# Patient Record
Sex: FEMALE | Race: WHITE | NOT HISPANIC OR LATINO
[De-identification: names, ages, dates, MRNs, and addresses within clinical notes are randomized per-mention and may not be internally consistent; named-entity substitution may affect disease eponyms.]

---

## 2018-11-07 ENCOUNTER — ASOB RESULT (OUTPATIENT)
Age: 33
End: 2018-11-07

## 2018-11-07 ENCOUNTER — APPOINTMENT (OUTPATIENT)
Dept: ANTEPARTUM | Facility: CLINIC | Age: 33
End: 2018-11-07
Payer: COMMERCIAL

## 2018-11-07 PROCEDURE — 76811 OB US DETAILED SNGL FETUS: CPT

## 2018-11-07 PROCEDURE — 99243 OFF/OP CNSLTJ NEW/EST LOW 30: CPT | Mod: 25

## 2018-11-12 ENCOUNTER — APPOINTMENT (OUTPATIENT)
Dept: ANTEPARTUM | Facility: CLINIC | Age: 33
End: 2018-11-12
Payer: COMMERCIAL

## 2018-11-12 ENCOUNTER — ASOB RESULT (OUTPATIENT)
Age: 33
End: 2018-11-12

## 2018-11-12 PROCEDURE — 99241 OFFICE CONSULTATION NEW/ESTAB PATIENT 15 MIN: CPT | Mod: 25

## 2018-11-21 ENCOUNTER — OUTPATIENT (OUTPATIENT)
Dept: OUTPATIENT SERVICES | Age: 33
LOS: 1 days | Discharge: HOME | End: 2018-11-21

## 2018-11-23 ENCOUNTER — APPOINTMENT (OUTPATIENT)
Dept: PEDIATRIC CARDIOLOGY | Facility: CLINIC | Age: 33
End: 2018-11-23

## 2018-11-25 ENCOUNTER — TRANSCRIPTION ENCOUNTER (OUTPATIENT)
Age: 33
End: 2018-11-25

## 2018-11-29 ENCOUNTER — APPOINTMENT (OUTPATIENT)
Dept: PEDIATRIC CARDIOLOGY | Facility: CLINIC | Age: 33
End: 2018-11-29
Payer: COMMERCIAL

## 2018-11-29 PROCEDURE — 76821 MIDDLE CEREBRAL ARTERY ECHO: CPT

## 2018-11-29 PROCEDURE — 76825 ECHO EXAM OF FETAL HEART: CPT

## 2018-11-29 PROCEDURE — 76827 ECHO EXAM OF FETAL HEART: CPT

## 2018-11-29 PROCEDURE — 76820 UMBILICAL ARTERY ECHO: CPT

## 2018-11-29 PROCEDURE — 99202 OFFICE O/P NEW SF 15 MIN: CPT | Mod: 25

## 2018-11-29 PROCEDURE — 93325 DOPPLER ECHO COLOR FLOW MAPG: CPT | Mod: 59

## 2018-12-07 ENCOUNTER — APPOINTMENT (OUTPATIENT)
Dept: ANTEPARTUM | Facility: CLINIC | Age: 33
End: 2018-12-07
Payer: COMMERCIAL

## 2018-12-07 ENCOUNTER — ASOB RESULT (OUTPATIENT)
Age: 33
End: 2018-12-07

## 2018-12-07 PROCEDURE — 99214 OFFICE O/P EST MOD 30 MIN: CPT | Mod: 25

## 2018-12-07 PROCEDURE — 76816 OB US FOLLOW-UP PER FETUS: CPT

## 2018-12-18 ENCOUNTER — APPOINTMENT (OUTPATIENT)
Dept: PLASTIC SURGERY | Facility: CLINIC | Age: 33
End: 2018-12-18
Payer: COMMERCIAL

## 2018-12-18 VITALS — HEIGHT: 64 IN | BODY MASS INDEX: 34.15 KG/M2 | WEIGHT: 200 LBS

## 2018-12-18 DIAGNOSIS — Z80.0 FAMILY HISTORY OF MALIGNANT NEOPLASM OF DIGESTIVE ORGANS: ICD-10-CM

## 2018-12-18 DIAGNOSIS — Z80.3 FAMILY HISTORY OF MALIGNANT NEOPLASM OF BREAST: ICD-10-CM

## 2018-12-18 DIAGNOSIS — O28.3 ABNORMAL ULTRASONIC FINDING ON ANTENATAL SCREENING OF MOTHER: ICD-10-CM

## 2018-12-18 DIAGNOSIS — Z82.49 FAMILY HISTORY OF ISCHEMIC HEART DISEASE AND OTHER DISEASES OF THE CIRCULATORY SYSTEM: ICD-10-CM

## 2018-12-18 PROCEDURE — 99203 OFFICE O/P NEW LOW 30 MIN: CPT

## 2018-12-28 ENCOUNTER — APPOINTMENT (OUTPATIENT)
Dept: ANTEPARTUM | Facility: CLINIC | Age: 33
End: 2018-12-28
Payer: COMMERCIAL

## 2018-12-28 ENCOUNTER — ASOB RESULT (OUTPATIENT)
Age: 33
End: 2018-12-28

## 2018-12-28 PROCEDURE — 76816 OB US FOLLOW-UP PER FETUS: CPT

## 2018-12-28 PROCEDURE — 76819 FETAL BIOPHYS PROFIL W/O NST: CPT

## 2019-01-28 ENCOUNTER — APPOINTMENT (OUTPATIENT)
Dept: ANTEPARTUM | Facility: CLINIC | Age: 34
End: 2019-01-28
Payer: COMMERCIAL

## 2019-01-28 ENCOUNTER — ASOB RESULT (OUTPATIENT)
Age: 34
End: 2019-01-28

## 2019-01-28 PROCEDURE — 76819 FETAL BIOPHYS PROFIL W/O NST: CPT

## 2019-01-28 PROCEDURE — 76816 OB US FOLLOW-UP PER FETUS: CPT

## 2019-02-06 ENCOUNTER — ASOB RESULT (OUTPATIENT)
Age: 34
End: 2019-02-06

## 2019-02-06 ENCOUNTER — APPOINTMENT (OUTPATIENT)
Dept: ANTEPARTUM | Facility: HOSPITAL | Age: 34
End: 2019-02-06
Payer: COMMERCIAL

## 2019-02-06 ENCOUNTER — OUTPATIENT (OUTPATIENT)
Dept: OUTPATIENT SERVICES | Facility: HOSPITAL | Age: 34
LOS: 1 days | End: 2019-02-06

## 2019-02-06 DIAGNOSIS — Z3A.00 WEEKS OF GESTATION OF PREGNANCY NOT SPECIFIED: ICD-10-CM

## 2019-02-06 DIAGNOSIS — O26.899 OTHER SPECIFIED PREGNANCY RELATED CONDITIONS, UNSPECIFIED TRIMESTER: ICD-10-CM

## 2019-02-06 LAB
ALBUMIN SERPL ELPH-MCNC: 3.7 G/DL — SIGNIFICANT CHANGE UP (ref 3.3–5)
ALP SERPL-CCNC: 98 U/L — SIGNIFICANT CHANGE UP (ref 40–120)
ALT FLD-CCNC: 19 U/L — SIGNIFICANT CHANGE UP (ref 4–33)
ANION GAP SERPL CALC-SCNC: 15 MMO/L — HIGH (ref 7–14)
APPEARANCE UR: CLEAR — SIGNIFICANT CHANGE UP
APTT BLD: 29.7 SEC — SIGNIFICANT CHANGE UP (ref 27.5–36.3)
AST SERPL-CCNC: 16 U/L — SIGNIFICANT CHANGE UP (ref 4–32)
BASOPHILS # BLD AUTO: 0.04 K/UL — SIGNIFICANT CHANGE UP (ref 0–0.2)
BASOPHILS NFR BLD AUTO: 0.3 % — SIGNIFICANT CHANGE UP (ref 0–2)
BILIRUB SERPL-MCNC: < 0.2 MG/DL — LOW (ref 0.2–1.2)
BILIRUB UR-MCNC: NEGATIVE — SIGNIFICANT CHANGE UP
BLOOD UR QL VISUAL: SIGNIFICANT CHANGE UP
BUN SERPL-MCNC: 11 MG/DL — SIGNIFICANT CHANGE UP (ref 7–23)
CALCIUM SERPL-MCNC: 9.5 MG/DL — SIGNIFICANT CHANGE UP (ref 8.4–10.5)
CHLORIDE SERPL-SCNC: 101 MMOL/L — SIGNIFICANT CHANGE UP (ref 98–107)
CO2 SERPL-SCNC: 18 MMOL/L — LOW (ref 22–31)
COLOR SPEC: SIGNIFICANT CHANGE UP
CREAT ?TM UR-MCNC: 43.3 MG/DL — SIGNIFICANT CHANGE UP
CREAT SERPL-MCNC: 0.51 MG/DL — SIGNIFICANT CHANGE UP (ref 0.5–1.3)
EOSINOPHIL # BLD AUTO: 0.06 K/UL — SIGNIFICANT CHANGE UP (ref 0–0.5)
EOSINOPHIL NFR BLD AUTO: 0.4 % — SIGNIFICANT CHANGE UP (ref 0–6)
FIBRINOGEN PPP-MCNC: 784 MG/DL — HIGH (ref 350–510)
GLUCOSE SERPL-MCNC: 74 MG/DL — SIGNIFICANT CHANGE UP (ref 70–99)
GLUCOSE UR-MCNC: NEGATIVE — SIGNIFICANT CHANGE UP
HCT VFR BLD CALC: 39 % — SIGNIFICANT CHANGE UP (ref 34.5–45)
HGB BLD-MCNC: 12.8 G/DL — SIGNIFICANT CHANGE UP (ref 11.5–15.5)
IMM GRANULOCYTES NFR BLD AUTO: 0.8 % — SIGNIFICANT CHANGE UP (ref 0–1.5)
INR BLD: 1.05 — SIGNIFICANT CHANGE UP (ref 0.88–1.17)
KETONES UR-MCNC: NEGATIVE — SIGNIFICANT CHANGE UP
LDH SERPL L TO P-CCNC: 181 U/L — SIGNIFICANT CHANGE UP (ref 135–225)
LEUKOCYTE ESTERASE UR-ACNC: NEGATIVE — SIGNIFICANT CHANGE UP
LYMPHOCYTES # BLD AUTO: 1.81 K/UL — SIGNIFICANT CHANGE UP (ref 1–3.3)
LYMPHOCYTES # BLD AUTO: 13.5 % — SIGNIFICANT CHANGE UP (ref 13–44)
MCHC RBC-ENTMCNC: 30.1 PG — SIGNIFICANT CHANGE UP (ref 27–34)
MCHC RBC-ENTMCNC: 32.8 % — SIGNIFICANT CHANGE UP (ref 32–36)
MCV RBC AUTO: 91.8 FL — SIGNIFICANT CHANGE UP (ref 80–100)
MONOCYTES # BLD AUTO: 0.87 K/UL — SIGNIFICANT CHANGE UP (ref 0–0.9)
MONOCYTES NFR BLD AUTO: 6.5 % — SIGNIFICANT CHANGE UP (ref 2–14)
NEUTROPHILS # BLD AUTO: 10.47 K/UL — HIGH (ref 1.8–7.4)
NEUTROPHILS NFR BLD AUTO: 78.5 % — HIGH (ref 43–77)
NITRITE UR-MCNC: NEGATIVE — SIGNIFICANT CHANGE UP
NRBC # FLD: 0 K/UL — LOW (ref 25–125)
PH UR: 6.5 — SIGNIFICANT CHANGE UP (ref 5–8)
PLATELET # BLD AUTO: 359 K/UL — SIGNIFICANT CHANGE UP (ref 150–400)
PMV BLD: 9.8 FL — SIGNIFICANT CHANGE UP (ref 7–13)
POTASSIUM SERPL-MCNC: 3.7 MMOL/L — SIGNIFICANT CHANGE UP (ref 3.5–5.3)
POTASSIUM SERPL-SCNC: 3.7 MMOL/L — SIGNIFICANT CHANGE UP (ref 3.5–5.3)
PROT SERPL-MCNC: 6.9 G/DL — SIGNIFICANT CHANGE UP (ref 6–8.3)
PROT UR-MCNC: 7 MG/DL — SIGNIFICANT CHANGE UP
PROT UR-MCNC: NEGATIVE — SIGNIFICANT CHANGE UP
PROTHROM AB SERPL-ACNC: 12 SEC — SIGNIFICANT CHANGE UP (ref 9.8–13.1)
RBC # BLD: 4.25 M/UL — SIGNIFICANT CHANGE UP (ref 3.8–5.2)
RBC # FLD: 12.9 % — SIGNIFICANT CHANGE UP (ref 10.3–14.5)
SODIUM SERPL-SCNC: 134 MMOL/L — LOW (ref 135–145)
SP GR SPEC: 1.01 — SIGNIFICANT CHANGE UP (ref 1–1.04)
URATE SERPL-MCNC: 3.8 MG/DL — SIGNIFICANT CHANGE UP (ref 2.5–7)
UROBILINOGEN FLD QL: NORMAL — SIGNIFICANT CHANGE UP
WBC # BLD: 13.36 K/UL — HIGH (ref 3.8–10.5)
WBC # FLD AUTO: 13.36 K/UL — HIGH (ref 3.8–10.5)

## 2019-02-06 PROCEDURE — 76819 FETAL BIOPHYS PROFIL W/O NST: CPT

## 2019-02-07 ENCOUNTER — APPOINTMENT (OUTPATIENT)
Dept: OTHER | Facility: CLINIC | Age: 34
End: 2019-02-07
Payer: COMMERCIAL

## 2019-02-07 PROCEDURE — 99242 OFF/OP CONSLTJ NEW/EST SF 20: CPT

## 2019-02-11 ENCOUNTER — OUTPATIENT (OUTPATIENT)
Dept: OUTPATIENT SERVICES | Facility: HOSPITAL | Age: 34
LOS: 1 days | End: 2019-02-11
Payer: COMMERCIAL

## 2019-02-11 DIAGNOSIS — O26.899 OTHER SPECIFIED PREGNANCY RELATED CONDITIONS, UNSPECIFIED TRIMESTER: ICD-10-CM

## 2019-02-11 DIAGNOSIS — Z3A.00 WEEKS OF GESTATION OF PREGNANCY NOT SPECIFIED: ICD-10-CM

## 2019-02-11 LAB
ALBUMIN SERPL ELPH-MCNC: 3.5 G/DL — SIGNIFICANT CHANGE UP (ref 3.3–5)
ALP SERPL-CCNC: 94 U/L — SIGNIFICANT CHANGE UP (ref 40–120)
ALT FLD-CCNC: 16 U/L — SIGNIFICANT CHANGE UP (ref 4–33)
ANION GAP SERPL CALC-SCNC: 12 MMO/L — SIGNIFICANT CHANGE UP (ref 7–14)
APPEARANCE UR: SIGNIFICANT CHANGE UP
APTT BLD: 28.5 SEC — SIGNIFICANT CHANGE UP (ref 27.5–36.3)
AST SERPL-CCNC: 14 U/L — SIGNIFICANT CHANGE UP (ref 4–32)
BACTERIA # UR AUTO: SIGNIFICANT CHANGE UP
BASOPHILS # BLD AUTO: 0.05 K/UL — SIGNIFICANT CHANGE UP (ref 0–0.2)
BASOPHILS NFR BLD AUTO: 0.4 % — SIGNIFICANT CHANGE UP (ref 0–2)
BILIRUB SERPL-MCNC: < 0.2 MG/DL — LOW (ref 0.2–1.2)
BILIRUB UR-MCNC: NEGATIVE — SIGNIFICANT CHANGE UP
BLOOD UR QL VISUAL: SIGNIFICANT CHANGE UP
BUN SERPL-MCNC: 10 MG/DL — SIGNIFICANT CHANGE UP (ref 7–23)
CALCIUM SERPL-MCNC: 9.2 MG/DL — SIGNIFICANT CHANGE UP (ref 8.4–10.5)
CHLORIDE SERPL-SCNC: 103 MMOL/L — SIGNIFICANT CHANGE UP (ref 98–107)
CO2 SERPL-SCNC: 21 MMOL/L — LOW (ref 22–31)
COLOR SPEC: YELLOW — SIGNIFICANT CHANGE UP
CREAT ?TM UR-MCNC: 102.9 MG/DL — SIGNIFICANT CHANGE UP
CREAT SERPL-MCNC: 0.57 MG/DL — SIGNIFICANT CHANGE UP (ref 0.5–1.3)
EOSINOPHIL # BLD AUTO: 0.1 K/UL — SIGNIFICANT CHANGE UP (ref 0–0.5)
EOSINOPHIL NFR BLD AUTO: 0.7 % — SIGNIFICANT CHANGE UP (ref 0–6)
FIBRINOGEN PPP-MCNC: 751.4 MG/DL — HIGH (ref 350–510)
GLUCOSE SERPL-MCNC: 81 MG/DL — SIGNIFICANT CHANGE UP (ref 70–99)
GLUCOSE UR-MCNC: NEGATIVE — SIGNIFICANT CHANGE UP
HCT VFR BLD CALC: 37.8 % — SIGNIFICANT CHANGE UP (ref 34.5–45)
HGB BLD-MCNC: 12.3 G/DL — SIGNIFICANT CHANGE UP (ref 11.5–15.5)
IMM GRANULOCYTES NFR BLD AUTO: 1 % — SIGNIFICANT CHANGE UP (ref 0–1.5)
INR BLD: 1.1 — SIGNIFICANT CHANGE UP (ref 0.88–1.17)
KETONES UR-MCNC: NEGATIVE — SIGNIFICANT CHANGE UP
LDH SERPL L TO P-CCNC: 162 U/L — SIGNIFICANT CHANGE UP (ref 135–225)
LEUKOCYTE ESTERASE UR-ACNC: NEGATIVE — SIGNIFICANT CHANGE UP
LYMPHOCYTES # BLD AUTO: 15.5 % — SIGNIFICANT CHANGE UP (ref 13–44)
LYMPHOCYTES # BLD AUTO: 2.09 K/UL — SIGNIFICANT CHANGE UP (ref 1–3.3)
MCHC RBC-ENTMCNC: 29.4 PG — SIGNIFICANT CHANGE UP (ref 27–34)
MCHC RBC-ENTMCNC: 32.5 % — SIGNIFICANT CHANGE UP (ref 32–36)
MCV RBC AUTO: 90.4 FL — SIGNIFICANT CHANGE UP (ref 80–100)
MONOCYTES # BLD AUTO: 1.06 K/UL — HIGH (ref 0–0.9)
MONOCYTES NFR BLD AUTO: 7.9 % — SIGNIFICANT CHANGE UP (ref 2–14)
NEUTROPHILS # BLD AUTO: 10.06 K/UL — HIGH (ref 1.8–7.4)
NEUTROPHILS NFR BLD AUTO: 74.5 % — SIGNIFICANT CHANGE UP (ref 43–77)
NITRITE UR-MCNC: NEGATIVE — SIGNIFICANT CHANGE UP
NRBC # FLD: 0 K/UL — LOW (ref 25–125)
PH UR: 6 — SIGNIFICANT CHANGE UP (ref 5–8)
PLATELET # BLD AUTO: 310 K/UL — SIGNIFICANT CHANGE UP (ref 150–400)
PMV BLD: 9.7 FL — SIGNIFICANT CHANGE UP (ref 7–13)
POTASSIUM SERPL-MCNC: 4.2 MMOL/L — SIGNIFICANT CHANGE UP (ref 3.5–5.3)
POTASSIUM SERPL-SCNC: 4.2 MMOL/L — SIGNIFICANT CHANGE UP (ref 3.5–5.3)
PROT SERPL-MCNC: 6.5 G/DL — SIGNIFICANT CHANGE UP (ref 6–8.3)
PROT UR-MCNC: 20 — SIGNIFICANT CHANGE UP
PROT UR-MCNC: 29.5 MG/DL — SIGNIFICANT CHANGE UP
PROTHROM AB SERPL-ACNC: 12.2 SEC — SIGNIFICANT CHANGE UP (ref 9.8–13.1)
RBC # BLD: 4.18 M/UL — SIGNIFICANT CHANGE UP (ref 3.8–5.2)
RBC # FLD: 12.8 % — SIGNIFICANT CHANGE UP (ref 10.3–14.5)
RBC CASTS # UR COMP ASSIST: SIGNIFICANT CHANGE UP (ref 0–?)
SODIUM SERPL-SCNC: 136 MMOL/L — SIGNIFICANT CHANGE UP (ref 135–145)
SP GR SPEC: 1.02 — SIGNIFICANT CHANGE UP (ref 1–1.04)
SQUAMOUS # UR AUTO: SIGNIFICANT CHANGE UP
URATE SERPL-MCNC: 3.9 MG/DL — SIGNIFICANT CHANGE UP (ref 2.5–7)
UROBILINOGEN FLD QL: NORMAL — SIGNIFICANT CHANGE UP
WBC # BLD: 13.5 K/UL — HIGH (ref 3.8–10.5)
WBC # FLD AUTO: 13.5 K/UL — HIGH (ref 3.8–10.5)
WBC UR QL: HIGH (ref 0–?)

## 2019-02-11 PROCEDURE — 59025 FETAL NON-STRESS TEST: CPT | Mod: 26

## 2019-02-11 RX ORDER — ACETAMINOPHEN 500 MG
975 TABLET ORAL ONCE
Qty: 0 | Refills: 0 | Status: DISCONTINUED | OUTPATIENT
Start: 2019-02-11 | End: 2019-02-26

## 2019-02-19 ENCOUNTER — ASOB RESULT (OUTPATIENT)
Age: 34
End: 2019-02-19

## 2019-02-19 ENCOUNTER — APPOINTMENT (OUTPATIENT)
Dept: ANTEPARTUM | Facility: CLINIC | Age: 34
End: 2019-02-19
Payer: COMMERCIAL

## 2019-02-19 PROCEDURE — 76816 OB US FOLLOW-UP PER FETUS: CPT

## 2019-02-19 PROCEDURE — 76819 FETAL BIOPHYS PROFIL W/O NST: CPT

## 2019-02-26 ENCOUNTER — OUTPATIENT (OUTPATIENT)
Dept: OUTPATIENT SERVICES | Facility: HOSPITAL | Age: 34
LOS: 1 days | End: 2019-02-26

## 2019-02-26 VITALS
DIASTOLIC BLOOD PRESSURE: 78 MMHG | WEIGHT: 229.94 LBS | RESPIRATION RATE: 16 BRPM | SYSTOLIC BLOOD PRESSURE: 120 MMHG | HEART RATE: 84 BPM | TEMPERATURE: 97 F | OXYGEN SATURATION: 98 % | HEIGHT: 64 IN

## 2019-02-26 DIAGNOSIS — O40.3XX1 POLYHYDRAMNIOS, THIRD TRIMESTER, FETUS 1: ICD-10-CM

## 2019-02-26 DIAGNOSIS — Z98.890 OTHER SPECIFIED POSTPROCEDURAL STATES: Chronic | ICD-10-CM

## 2019-02-26 LAB
APPEARANCE UR: SIGNIFICANT CHANGE UP
BACTERIA # UR AUTO: SIGNIFICANT CHANGE UP
BILIRUB UR-MCNC: NEGATIVE — SIGNIFICANT CHANGE UP
BLD GP AB SCN SERPL QL: NEGATIVE — SIGNIFICANT CHANGE UP
BLOOD UR QL VISUAL: SIGNIFICANT CHANGE UP
COD CRY URNS QL: SIGNIFICANT CHANGE UP (ref 0–0)
COLOR SPEC: YELLOW — SIGNIFICANT CHANGE UP
GLUCOSE UR-MCNC: NEGATIVE — SIGNIFICANT CHANGE UP
HCT VFR BLD CALC: 36.3 % — SIGNIFICANT CHANGE UP (ref 34.5–45)
HGB BLD-MCNC: 11.8 G/DL — SIGNIFICANT CHANGE UP (ref 11.5–15.5)
HYALINE CASTS # UR AUTO: HIGH
KETONES UR-MCNC: NEGATIVE — SIGNIFICANT CHANGE UP
LEUKOCYTE ESTERASE UR-ACNC: NEGATIVE — SIGNIFICANT CHANGE UP
MCHC RBC-ENTMCNC: 29.4 PG — SIGNIFICANT CHANGE UP (ref 27–34)
MCHC RBC-ENTMCNC: 32.5 % — SIGNIFICANT CHANGE UP (ref 32–36)
MCV RBC AUTO: 90.3 FL — SIGNIFICANT CHANGE UP (ref 80–100)
NITRITE UR-MCNC: NEGATIVE — SIGNIFICANT CHANGE UP
NRBC # FLD: 0 K/UL — LOW (ref 25–125)
PH UR: 6 — SIGNIFICANT CHANGE UP (ref 5–8)
PLATELET # BLD AUTO: 319 K/UL — SIGNIFICANT CHANGE UP (ref 150–400)
PMV BLD: 10 FL — SIGNIFICANT CHANGE UP (ref 7–13)
PROT UR-MCNC: 30 — SIGNIFICANT CHANGE UP
RBC # BLD: 4.02 M/UL — SIGNIFICANT CHANGE UP (ref 3.8–5.2)
RBC # FLD: 13.2 % — SIGNIFICANT CHANGE UP (ref 10.3–14.5)
RBC CASTS # UR COMP ASSIST: SIGNIFICANT CHANGE UP (ref 0–?)
RH IG SCN BLD-IMP: POSITIVE — SIGNIFICANT CHANGE UP
SP GR SPEC: 1.03 — SIGNIFICANT CHANGE UP (ref 1–1.04)
SQUAMOUS # UR AUTO: SIGNIFICANT CHANGE UP
UROBILINOGEN FLD QL: NORMAL — SIGNIFICANT CHANGE UP
WBC # BLD: 11.92 K/UL — HIGH (ref 3.8–10.5)
WBC # FLD AUTO: 11.92 K/UL — HIGH (ref 3.8–10.5)
WBC UR QL: SIGNIFICANT CHANGE UP (ref 0–?)

## 2019-02-26 NOTE — H&P PST ADULT - HISTORY OF PRESENT ILLNESS
34 y/o female 38.4 weeks pregnant, , ENMA 19, LMP 18 presents to PST for pre op evaluation in preparation for Cytotec Induction of labor on 19 due to polyhydramnios and  Cleft lip and palate as per OB GYN Dr. Hamlin.  Pt denies abnormal vaginal bleeding or leaking  Reports positive fetal movement during PST

## 2019-02-26 NOTE — H&P PST ADULT - FAMILY HISTORY
Mother  Still living? Yes, Estimated age: 61-70  Hypertension, Age at diagnosis: Age Unknown     Father  Still living? Yes, Estimated age: 71-80  Family history of throat cancer, Age at diagnosis: Age Unknown     Grandparent  Still living? Unknown  Family history of colon cancer, Age at diagnosis: Age Unknown     Sibling  Still living? Yes, Estimated age: Age Unknown  Family history of Hashimoto thyroiditis, Age at diagnosis: Age Unknown

## 2019-02-26 NOTE — H&P PST ADULT - NSANTHOSAYNRD_GEN_A_CORE
No. MASOUD screening performed.  STOP BANG Legend: 0-2 = LOW Risk; 3-4 = INTERMEDIATE Risk; 5-8 = HIGH Risk

## 2019-02-26 NOTE — H&P PST ADULT - NEGATIVE GENERAL GENITOURINARY SYMPTOMS
no hematuria/no flank pain R/no renal colic/no flank pain L/no gas in urine/no bladder infections/no urine discoloration

## 2019-02-26 NOTE — H&P PST ADULT - PROBLEM SELECTOR PLAN 1
Scheduled for Cytotec Induction of Labor at 40 weeks, Poly, Cleft lip and palate on 03/03/19 as per OB GYN Dr. Hamlin. Pre op instructions, famotidine given and explained. Pt verbalized understanding.

## 2019-02-26 NOTE — H&P PST ADULT - NEGATIVE CARDIOVASCULAR SYMPTOMS
no orthopnea/no paroxysmal nocturnal dyspnea/no palpitations/no claudication/no dyspnea on exertion/no chest pain/no peripheral edema

## 2019-02-26 NOTE — H&P PST ADULT - RS GEN PE MLT RESP DETAILS PC
no rhonchi/breath sounds equal/clear to auscultation bilaterally/no intercostal retractions/no wheezes/no chest wall tenderness/no subcutaneous emphysema/good air movement/no rales/respirations non-labored/airway patent

## 2019-02-27 LAB — T PALLIDUM AB TITR SER: NEGATIVE — SIGNIFICANT CHANGE UP

## 2019-03-02 ENCOUNTER — TRANSCRIPTION ENCOUNTER (OUTPATIENT)
Age: 34
End: 2019-03-02

## 2019-03-03 ENCOUNTER — INPATIENT (INPATIENT)
Facility: HOSPITAL | Age: 34
LOS: 6 days | Discharge: HOME | End: 2019-03-10
Attending: OBSTETRICS & GYNECOLOGY | Admitting: OBSTETRICS & GYNECOLOGY
Payer: COMMERCIAL

## 2019-03-03 DIAGNOSIS — O40.3XX1 POLYHYDRAMNIOS, THIRD TRIMESTER, FETUS 1: ICD-10-CM

## 2019-03-03 DIAGNOSIS — Z98.890 OTHER SPECIFIED POSTPROCEDURAL STATES: Chronic | ICD-10-CM

## 2019-03-04 VITALS — WEIGHT: 227.08 LBS | HEIGHT: 64 IN

## 2019-03-04 LAB
ALBUMIN SERPL ELPH-MCNC: 3.6 G/DL — SIGNIFICANT CHANGE UP (ref 3.3–5)
ALP SERPL-CCNC: 120 U/L — SIGNIFICANT CHANGE UP (ref 40–120)
ALT FLD-CCNC: 21 U/L — SIGNIFICANT CHANGE UP (ref 4–33)
ANION GAP SERPL CALC-SCNC: 15 MMO/L — HIGH (ref 7–14)
APPEARANCE UR: CLEAR — SIGNIFICANT CHANGE UP
APTT BLD: 28.5 SEC — SIGNIFICANT CHANGE UP (ref 27.5–36.3)
AST SERPL-CCNC: 18 U/L — SIGNIFICANT CHANGE UP (ref 4–32)
BASOPHILS # BLD AUTO: 0.05 K/UL — SIGNIFICANT CHANGE UP (ref 0–0.2)
BASOPHILS NFR BLD AUTO: 0.4 % — SIGNIFICANT CHANGE UP (ref 0–2)
BILIRUB SERPL-MCNC: < 0.2 MG/DL — LOW (ref 0.2–1.2)
BILIRUB UR-MCNC: NEGATIVE — SIGNIFICANT CHANGE UP
BLOOD UR QL VISUAL: SIGNIFICANT CHANGE UP
BUN SERPL-MCNC: 10 MG/DL — SIGNIFICANT CHANGE UP (ref 7–23)
CALCIUM SERPL-MCNC: 10.2 MG/DL — SIGNIFICANT CHANGE UP (ref 8.4–10.5)
CHLORIDE SERPL-SCNC: 101 MMOL/L — SIGNIFICANT CHANGE UP (ref 98–107)
CO2 SERPL-SCNC: 22 MMOL/L — SIGNIFICANT CHANGE UP (ref 22–31)
COLOR SPEC: SIGNIFICANT CHANGE UP
CREAT ?TM UR-MCNC: 67 MG/DL — SIGNIFICANT CHANGE UP
CREAT SERPL-MCNC: 0.66 MG/DL — SIGNIFICANT CHANGE UP (ref 0.5–1.3)
EOSINOPHIL # BLD AUTO: 0.12 K/UL — SIGNIFICANT CHANGE UP (ref 0–0.5)
EOSINOPHIL NFR BLD AUTO: 1 % — SIGNIFICANT CHANGE UP (ref 0–6)
EPI CELLS # UR: SIGNIFICANT CHANGE UP
FIBRINOGEN PPP-MCNC: 760 MG/DL — HIGH (ref 350–510)
GLUCOSE SERPL-MCNC: 91 MG/DL — SIGNIFICANT CHANGE UP (ref 70–99)
GLUCOSE UR-MCNC: NEGATIVE — SIGNIFICANT CHANGE UP
HCT VFR BLD CALC: 40.1 % — SIGNIFICANT CHANGE UP (ref 34.5–45)
HGB BLD-MCNC: 12.7 G/DL — SIGNIFICANT CHANGE UP (ref 11.5–15.5)
IMM GRANULOCYTES NFR BLD AUTO: 0.8 % — SIGNIFICANT CHANGE UP (ref 0–1.5)
INR BLD: 1.04 — SIGNIFICANT CHANGE UP (ref 0.88–1.17)
KETONES UR-MCNC: NEGATIVE — SIGNIFICANT CHANGE UP
LDH SERPL L TO P-CCNC: 189 U/L — SIGNIFICANT CHANGE UP (ref 135–225)
LEUKOCYTE ESTERASE UR-ACNC: NEGATIVE — SIGNIFICANT CHANGE UP
LYMPHOCYTES # BLD AUTO: 16.3 % — SIGNIFICANT CHANGE UP (ref 13–44)
LYMPHOCYTES # BLD AUTO: 2.04 K/UL — SIGNIFICANT CHANGE UP (ref 1–3.3)
MCHC RBC-ENTMCNC: 29.3 PG — SIGNIFICANT CHANGE UP (ref 27–34)
MCHC RBC-ENTMCNC: 31.7 % — LOW (ref 32–36)
MCV RBC AUTO: 92.6 FL — SIGNIFICANT CHANGE UP (ref 80–100)
MONOCYTES # BLD AUTO: 1.07 K/UL — HIGH (ref 0–0.9)
MONOCYTES NFR BLD AUTO: 8.5 % — SIGNIFICANT CHANGE UP (ref 2–14)
NEUTROPHILS # BLD AUTO: 9.17 K/UL — HIGH (ref 1.8–7.4)
NEUTROPHILS NFR BLD AUTO: 73 % — SIGNIFICANT CHANGE UP (ref 43–77)
NITRITE UR-MCNC: NEGATIVE — SIGNIFICANT CHANGE UP
NRBC # FLD: 0 K/UL — LOW (ref 25–125)
PH UR: 6 — SIGNIFICANT CHANGE UP (ref 5–8)
PLATELET # BLD AUTO: 342 K/UL — SIGNIFICANT CHANGE UP (ref 150–400)
PMV BLD: 10.1 FL — SIGNIFICANT CHANGE UP (ref 7–13)
POTASSIUM SERPL-MCNC: 4 MMOL/L — SIGNIFICANT CHANGE UP (ref 3.5–5.3)
POTASSIUM SERPL-SCNC: 4 MMOL/L — SIGNIFICANT CHANGE UP (ref 3.5–5.3)
PROT SERPL-MCNC: 7.2 G/DL — SIGNIFICANT CHANGE UP (ref 6–8.3)
PROT UR-MCNC: 7.7 MG/DL — SIGNIFICANT CHANGE UP
PROT UR-MCNC: NEGATIVE — SIGNIFICANT CHANGE UP
PROTHROM AB SERPL-ACNC: 11.9 SEC — SIGNIFICANT CHANGE UP (ref 9.8–13.1)
RBC # BLD: 4.33 M/UL — SIGNIFICANT CHANGE UP (ref 3.8–5.2)
RBC # FLD: 13.3 % — SIGNIFICANT CHANGE UP (ref 10.3–14.5)
RBC CASTS # UR COMP ASSIST: SIGNIFICANT CHANGE UP (ref 0–?)
RH IG SCN BLD-IMP: POSITIVE — SIGNIFICANT CHANGE UP
SODIUM SERPL-SCNC: 138 MMOL/L — SIGNIFICANT CHANGE UP (ref 135–145)
SP GR SPEC: 1.01 — SIGNIFICANT CHANGE UP (ref 1–1.04)
T PALLIDUM AB TITR SER: NEGATIVE — SIGNIFICANT CHANGE UP
URATE SERPL-MCNC: 4.3 MG/DL — SIGNIFICANT CHANGE UP (ref 2.5–7)
UROBILINOGEN FLD QL: NORMAL — SIGNIFICANT CHANGE UP
WBC # BLD: 12.55 K/UL — HIGH (ref 3.8–10.5)
WBC # FLD AUTO: 12.55 K/UL — HIGH (ref 3.8–10.5)
WBC UR QL: SIGNIFICANT CHANGE UP (ref 0–?)

## 2019-03-04 RX ORDER — SODIUM CHLORIDE 9 MG/ML
1000 INJECTION, SOLUTION INTRAVENOUS ONCE
Qty: 0 | Refills: 0 | Status: DISCONTINUED | OUTPATIENT
Start: 2019-03-04 | End: 2019-03-04

## 2019-03-04 RX ORDER — OXYTOCIN 10 UNIT/ML
333.33 VIAL (ML) INJECTION
Qty: 20 | Refills: 0 | Status: DISCONTINUED | OUTPATIENT
Start: 2019-03-04 | End: 2019-03-04

## 2019-03-04 RX ORDER — SODIUM CHLORIDE 9 MG/ML
1000 INJECTION, SOLUTION INTRAVENOUS ONCE
Qty: 0 | Refills: 0 | Status: DISCONTINUED | OUTPATIENT
Start: 2019-03-04 | End: 2019-03-05

## 2019-03-04 RX ORDER — OXYTOCIN 10 UNIT/ML
333.33 VIAL (ML) INJECTION
Qty: 20 | Refills: 0 | Status: DISCONTINUED | OUTPATIENT
Start: 2019-03-04 | End: 2019-03-05

## 2019-03-04 RX ORDER — CALCIUM CARBONATE 500(1250)
1 TABLET ORAL EVERY 4 HOURS
Qty: 0 | Refills: 0 | Status: DISCONTINUED | OUTPATIENT
Start: 2019-03-04 | End: 2019-03-06

## 2019-03-04 RX ORDER — ACETAMINOPHEN 500 MG
975 TABLET ORAL ONCE
Qty: 0 | Refills: 0 | Status: COMPLETED | OUTPATIENT
Start: 2019-03-04 | End: 2019-03-05

## 2019-03-04 RX ORDER — SODIUM CHLORIDE 9 MG/ML
1000 INJECTION, SOLUTION INTRAVENOUS
Qty: 0 | Refills: 0 | Status: DISCONTINUED | OUTPATIENT
Start: 2019-03-04 | End: 2019-03-04

## 2019-03-04 RX ORDER — SODIUM CHLORIDE 9 MG/ML
1000 INJECTION, SOLUTION INTRAVENOUS
Qty: 0 | Refills: 0 | Status: DISCONTINUED | OUTPATIENT
Start: 2019-03-04 | End: 2019-03-05

## 2019-03-04 RX ORDER — CITRIC ACID/SODIUM CITRATE 300-500 MG
15 SOLUTION, ORAL ORAL EVERY 4 HOURS
Qty: 0 | Refills: 0 | Status: DISCONTINUED | OUTPATIENT
Start: 2019-03-04 | End: 2019-03-05

## 2019-03-04 RX ORDER — ACETAMINOPHEN 500 MG
975 TABLET ORAL ONCE
Qty: 0 | Refills: 0 | Status: COMPLETED | OUTPATIENT
Start: 2019-03-04 | End: 2019-03-04

## 2019-03-04 RX ORDER — VALACYCLOVIR 500 MG/1
500 TABLET, FILM COATED ORAL
Qty: 0 | Refills: 0 | Status: DISCONTINUED | OUTPATIENT
Start: 2019-03-04 | End: 2019-03-06

## 2019-03-04 RX ORDER — CITRIC ACID/SODIUM CITRATE 300-500 MG
15 SOLUTION, ORAL ORAL EVERY 4 HOURS
Qty: 0 | Refills: 0 | Status: DISCONTINUED | OUTPATIENT
Start: 2019-03-04 | End: 2019-03-04

## 2019-03-04 RX ADMIN — VALACYCLOVIR 500 MILLIGRAM(S): 500 TABLET, FILM COATED ORAL at 17:50

## 2019-03-04 RX ADMIN — SODIUM CHLORIDE 125 MILLILITER(S): 9 INJECTION, SOLUTION INTRAVENOUS at 11:09

## 2019-03-04 RX ADMIN — Medication 975 MILLIGRAM(S): at 02:44

## 2019-03-04 RX ADMIN — Medication 1 TABLET(S): at 13:33

## 2019-03-04 RX ADMIN — VALACYCLOVIR 500 MILLIGRAM(S): 500 TABLET, FILM COATED ORAL at 06:45

## 2019-03-05 ENCOUNTER — TRANSCRIPTION ENCOUNTER (OUTPATIENT)
Age: 34
End: 2019-03-05

## 2019-03-05 PROCEDURE — 93010 ELECTROCARDIOGRAM REPORT: CPT

## 2019-03-05 RX ORDER — SODIUM CHLORIDE 9 MG/ML
500 INJECTION, SOLUTION INTRAVENOUS ONCE
Qty: 0 | Refills: 0 | Status: COMPLETED | OUTPATIENT
Start: 2019-03-05 | End: 2019-03-06

## 2019-03-05 RX ADMIN — Medication 975 MILLIGRAM(S): at 04:48

## 2019-03-05 RX ADMIN — Medication 1 TABLET(S): at 11:04

## 2019-03-05 RX ADMIN — Medication 0.25 MILLIGRAM(S): at 11:44

## 2019-03-05 RX ADMIN — VALACYCLOVIR 500 MILLIGRAM(S): 500 TABLET, FILM COATED ORAL at 06:28

## 2019-03-05 RX ADMIN — Medication 975 MILLIGRAM(S): at 03:49

## 2019-03-06 ENCOUNTER — TRANSCRIPTION ENCOUNTER (OUTPATIENT)
Age: 34
End: 2019-03-06

## 2019-03-06 RX ORDER — SODIUM CHLORIDE 9 MG/ML
1000 INJECTION, SOLUTION INTRAVENOUS
Qty: 0 | Refills: 0 | Status: DISCONTINUED | OUTPATIENT
Start: 2019-03-06 | End: 2019-03-06

## 2019-03-06 RX ORDER — DOCUSATE SODIUM 100 MG
100 CAPSULE ORAL
Qty: 0 | Refills: 0 | Status: DISCONTINUED | OUTPATIENT
Start: 2019-03-07 | End: 2019-03-10

## 2019-03-06 RX ORDER — LANOLIN
1 OINTMENT (GRAM) TOPICAL
Qty: 0 | Refills: 0 | Status: DISCONTINUED | OUTPATIENT
Start: 2019-03-07 | End: 2019-03-10

## 2019-03-06 RX ORDER — IBUPROFEN 200 MG
600 TABLET ORAL EVERY 6 HOURS
Qty: 0 | Refills: 0 | Status: COMPLETED | OUTPATIENT
Start: 2019-03-06 | End: 2020-02-02

## 2019-03-06 RX ORDER — VALACYCLOVIR 500 MG/1
1 TABLET, FILM COATED ORAL
Qty: 0 | Refills: 0 | COMMUNITY

## 2019-03-06 RX ORDER — SODIUM CHLORIDE 9 MG/ML
1000 INJECTION INTRAMUSCULAR; INTRAVENOUS; SUBCUTANEOUS ONCE
Qty: 0 | Refills: 0 | Status: DISCONTINUED | OUTPATIENT
Start: 2019-03-06 | End: 2019-03-07

## 2019-03-06 RX ORDER — AMPICILLIN TRIHYDRATE 250 MG
2 CAPSULE ORAL ONCE
Qty: 0 | Refills: 0 | Status: COMPLETED | OUTPATIENT
Start: 2019-03-06 | End: 2019-03-06

## 2019-03-06 RX ORDER — IBUPROFEN 200 MG
1 TABLET ORAL
Qty: 0 | Refills: 0 | DISCHARGE
Start: 2019-03-06

## 2019-03-06 RX ORDER — OXYTOCIN 10 UNIT/ML
41.67 VIAL (ML) INJECTION
Qty: 20 | Refills: 0 | Status: DISCONTINUED | OUTPATIENT
Start: 2019-03-06 | End: 2019-03-07

## 2019-03-06 RX ORDER — DIPHENHYDRAMINE HCL 50 MG
25 CAPSULE ORAL DAILY
Qty: 0 | Refills: 0 | Status: DISCONTINUED | OUTPATIENT
Start: 2019-03-06 | End: 2019-03-06

## 2019-03-06 RX ORDER — ERTAPENEM SODIUM 1 G/1
1000 INJECTION, POWDER, LYOPHILIZED, FOR SOLUTION INTRAMUSCULAR; INTRAVENOUS EVERY 24 HOURS
Qty: 0 | Refills: 0 | Status: DISCONTINUED | OUTPATIENT
Start: 2019-03-06 | End: 2019-03-08

## 2019-03-06 RX ORDER — ACETAMINOPHEN 500 MG
975 TABLET ORAL ONCE
Qty: 0 | Refills: 0 | Status: COMPLETED | OUTPATIENT
Start: 2019-03-06 | End: 2019-03-06

## 2019-03-06 RX ORDER — SODIUM CHLORIDE 9 MG/ML
1000 INJECTION, SOLUTION INTRAVENOUS
Qty: 0 | Refills: 0 | Status: DISCONTINUED | OUTPATIENT
Start: 2019-03-07 | End: 2019-03-07

## 2019-03-06 RX ORDER — GENTAMICIN SULFATE 40 MG/ML
500 VIAL (ML) INJECTION ONCE
Qty: 0 | Refills: 0 | Status: COMPLETED | OUTPATIENT
Start: 2019-03-06 | End: 2019-03-06

## 2019-03-06 RX ORDER — OXYTOCIN 10 UNIT/ML
2 VIAL (ML) INJECTION
Qty: 30 | Refills: 0 | Status: DISCONTINUED | OUTPATIENT
Start: 2019-03-06 | End: 2019-03-06

## 2019-03-06 RX ORDER — SODIUM CHLORIDE 9 MG/ML
500 INJECTION, SOLUTION INTRAVENOUS ONCE
Qty: 0 | Refills: 0 | Status: COMPLETED | OUTPATIENT
Start: 2019-03-06 | End: 2019-03-06

## 2019-03-06 RX ORDER — DIPHENHYDRAMINE HCL 50 MG
25 CAPSULE ORAL EVERY 6 HOURS
Qty: 0 | Refills: 0 | Status: DISCONTINUED | OUTPATIENT
Start: 2019-03-07 | End: 2019-03-10

## 2019-03-06 RX ORDER — AMPICILLIN TRIHYDRATE 250 MG
CAPSULE ORAL
Qty: 0 | Refills: 0 | Status: DISCONTINUED | OUTPATIENT
Start: 2019-03-06 | End: 2019-03-06

## 2019-03-06 RX ORDER — AMPICILLIN TRIHYDRATE 250 MG
2 CAPSULE ORAL EVERY 6 HOURS
Qty: 0 | Refills: 0 | Status: CANCELLED | OUTPATIENT
Start: 2019-03-07 | End: 2019-03-06

## 2019-03-06 RX ORDER — GLYCERIN ADULT
1 SUPPOSITORY, RECTAL RECTAL AT BEDTIME
Qty: 0 | Refills: 0 | Status: DISCONTINUED | OUTPATIENT
Start: 2019-03-07 | End: 2019-03-10

## 2019-03-06 RX ORDER — SIMETHICONE 80 MG/1
80 TABLET, CHEWABLE ORAL EVERY 4 HOURS
Qty: 0 | Refills: 0 | Status: DISCONTINUED | OUTPATIENT
Start: 2019-03-07 | End: 2019-03-10

## 2019-03-06 RX ORDER — SODIUM CHLORIDE 9 MG/ML
1000 INJECTION, SOLUTION INTRAVENOUS ONCE
Qty: 0 | Refills: 0 | Status: COMPLETED | OUTPATIENT
Start: 2019-03-06 | End: 2019-03-06

## 2019-03-06 RX ORDER — OXYCODONE HYDROCHLORIDE 5 MG/1
5 TABLET ORAL
Qty: 0 | Refills: 0 | Status: COMPLETED | OUTPATIENT
Start: 2019-03-06 | End: 2019-03-13

## 2019-03-06 RX ORDER — OXYCODONE HYDROCHLORIDE 5 MG/1
5 TABLET ORAL EVERY 4 HOURS
Qty: 0 | Refills: 0 | Status: COMPLETED | OUTPATIENT
Start: 2019-03-06 | End: 2019-03-13

## 2019-03-06 RX ORDER — ONDANSETRON 8 MG/1
4 TABLET, FILM COATED ORAL ONCE
Qty: 0 | Refills: 0 | Status: COMPLETED | OUTPATIENT
Start: 2019-03-06 | End: 2019-03-06

## 2019-03-06 RX ORDER — ACETAMINOPHEN 500 MG
975 TABLET ORAL EVERY 6 HOURS
Qty: 0 | Refills: 0 | Status: DISCONTINUED | OUTPATIENT
Start: 2019-03-06 | End: 2019-03-10

## 2019-03-06 RX ORDER — METOCLOPRAMIDE HCL 10 MG
10 TABLET ORAL ONCE
Qty: 0 | Refills: 0 | Status: COMPLETED | OUTPATIENT
Start: 2019-03-06 | End: 2019-03-06

## 2019-03-06 RX ORDER — SODIUM CHLORIDE 9 MG/ML
1000 INJECTION, SOLUTION INTRAVENOUS
Qty: 0 | Refills: 0 | Status: DISCONTINUED | OUTPATIENT
Start: 2019-03-06 | End: 2019-03-07

## 2019-03-06 RX ORDER — OXYTOCIN 10 UNIT/ML
41.67 VIAL (ML) INJECTION
Qty: 20 | Refills: 0 | Status: DISCONTINUED | OUTPATIENT
Start: 2019-03-07 | End: 2019-03-07

## 2019-03-06 RX ORDER — CITRIC ACID/SODIUM CITRATE 300-500 MG
30 SOLUTION, ORAL ORAL ONCE
Qty: 0 | Refills: 0 | Status: COMPLETED | OUTPATIENT
Start: 2019-03-06 | End: 2019-03-06

## 2019-03-06 RX ORDER — TETANUS TOXOID, REDUCED DIPHTHERIA TOXOID AND ACELLULAR PERTUSSIS VACCINE, ADSORBED 5; 2.5; 8; 8; 2.5 [IU]/.5ML; [IU]/.5ML; UG/.5ML; UG/.5ML; UG/.5ML
0.5 SUSPENSION INTRAMUSCULAR ONCE
Qty: 0 | Refills: 0 | Status: DISCONTINUED | OUTPATIENT
Start: 2019-03-07 | End: 2019-03-10

## 2019-03-06 RX ORDER — FERROUS SULFATE 325(65) MG
325 TABLET ORAL DAILY
Qty: 0 | Refills: 0 | Status: DISCONTINUED | OUTPATIENT
Start: 2019-03-07 | End: 2019-03-10

## 2019-03-06 RX ORDER — OXYTOCIN 10 UNIT/ML
333.33 VIAL (ML) INJECTION
Qty: 20 | Refills: 0 | Status: DISCONTINUED | OUTPATIENT
Start: 2019-03-06 | End: 2019-03-06

## 2019-03-06 RX ORDER — FAMOTIDINE 10 MG/ML
20 INJECTION INTRAVENOUS ONCE
Qty: 0 | Refills: 0 | Status: COMPLETED | OUTPATIENT
Start: 2019-03-06 | End: 2019-03-06

## 2019-03-06 RX ORDER — OXYTOCIN 10 UNIT/ML
333.33 VIAL (ML) INJECTION
Qty: 20 | Refills: 0 | Status: DISCONTINUED | OUTPATIENT
Start: 2019-03-06 | End: 2019-03-07

## 2019-03-06 RX ADMIN — VALACYCLOVIR 500 MILLIGRAM(S): 500 TABLET, FILM COATED ORAL at 23:33

## 2019-03-06 RX ADMIN — Medication 975 MILLIGRAM(S): at 23:30

## 2019-03-06 RX ADMIN — Medication 2 MILLIUNIT(S)/MIN: at 15:07

## 2019-03-06 RX ADMIN — SODIUM CHLORIDE 2000 MILLILITER(S): 9 INJECTION, SOLUTION INTRAVENOUS at 22:30

## 2019-03-06 RX ADMIN — Medication 500 MILLIGRAM(S): at 17:28

## 2019-03-06 RX ADMIN — FAMOTIDINE 20 MILLIGRAM(S): 10 INJECTION INTRAVENOUS at 20:20

## 2019-03-06 RX ADMIN — VALACYCLOVIR 500 MILLIGRAM(S): 500 TABLET, FILM COATED ORAL at 10:46

## 2019-03-06 RX ADMIN — SODIUM CHLORIDE 1000 MILLILITER(S): 9 INJECTION, SOLUTION INTRAVENOUS at 11:18

## 2019-03-06 RX ADMIN — Medication 30 MILLILITER(S): at 20:20

## 2019-03-06 RX ADMIN — Medication 975 MILLIGRAM(S): at 16:10

## 2019-03-06 RX ADMIN — Medication 216 GRAM(S): at 16:10

## 2019-03-06 RX ADMIN — Medication 10 MILLIGRAM(S): at 20:20

## 2019-03-06 RX ADMIN — ONDANSETRON 4 MILLIGRAM(S): 8 TABLET, FILM COATED ORAL at 15:23

## 2019-03-06 RX ADMIN — SODIUM CHLORIDE 250 MILLILITER(S): 9 INJECTION, SOLUTION INTRAVENOUS at 10:47

## 2019-03-06 RX ADMIN — SODIUM CHLORIDE 1000 MILLILITER(S): 9 INJECTION, SOLUTION INTRAVENOUS at 16:10

## 2019-03-06 RX ADMIN — Medication 975 MILLIGRAM(S): at 11:08

## 2019-03-06 RX ADMIN — SODIUM CHLORIDE 1000 MILLILITER(S): 9 INJECTION, SOLUTION INTRAVENOUS at 10:47

## 2019-03-06 NOTE — CONSULT NOTE ADULT - SUBJECTIVE AND OBJECTIVE BOX
32 y/o female 38.4 weeks pregnant, , ENMA 19, LMP 18 presents admitted [facility] 3/3/19 for Cytotec Induction of labor due to polyhydramnios and baby diagnosis of Cleft lip and palate as per OB GYN Dr. Hamlin. Parents were seen prenatally by Dr Guevara for the cleft diagnosis. Dr Guevara will examine the baby after birth.  Pt denies abnormal vaginal bleeding or leaking  Reports positive fetal movement during PST  Fetal HR has been WNL  Mom continues to be induced, no issues reported per floor NP and nurse    Allergies/Medications:   Allergies:        Allergies:  	No Known Allergies:     Home Medications:   * Patient Currently Takes Medications as of 2019 15:04 documented in Structured Notes  · 	Valtrex 500 mg oral tablet: Last Dose Taken:  , 1 tab(s) orally 2 times a day  · 	PNV Prenatal oral tablet: Last Dose Taken:  , 1 tab(s) orally once a day    PMH/PSH/FH/SH:    Past Medical History:  No pertinent past medical history.     Past Surgical History:  H/O colonoscopy.     Family History:  Mother  Still living? Yes, Estimated age: 61-70  Hypertension, Age at diagnosis: Age Unknown     Father  Still living? Yes, Estimated age: 71-80  Family history of throat cancer, Age at diagnosis: Age Unknown     Grandparent  Still living? Unknown  Family history of colon cancer, Age at diagnosis: Age Unknown     Sibling  Still living? Yes, Estimated age: Age Unknown  Family history of Hashimoto thyroiditis, Age at diagnosis: Age Unknown.     Social History:  · Marital Status		  · Lives With	spouse

## 2019-03-06 NOTE — DISCHARGE NOTE OB - PATIENT PORTAL LINK FT
You can access the [email or web address] Patient Portal, offered by [facility], by registering with the following website: [email or web address]

## 2019-03-06 NOTE — DISCHARGE NOTE OB - CARE PLAN
Goal:	return to usual activities  Assessment and plan of treatment:	nothing per vagina x 6 weeks, follow-up for incision check in 2 weeks, full post partum visit in 6 weeks. Principal Discharge DX:	 delivery delivered  Goal:	return to usual activities  Assessment and plan of treatment:	nothing per vagina x 6 weeks, follow-up for incision check in 2 weeks, full post partum visit in 6 weeks.

## 2019-03-06 NOTE — DISCHARGE NOTE OB - MATERIALS PROVIDED
Breastfeeding Mother’s Support Group Information/Back To Sleep Handout/  Immunization Record/Breastfeeding Log/Vaccinations/[facility] Hearing Screen Program/Shaken Baby Prevention Handout/Guide to Postpartum Care/[facility]  Screening Program/Breastfeeding Guide and Packet

## 2019-03-06 NOTE — DISCHARGE NOTE OB - MEDICATION SUMMARY - MEDICATIONS TO TAKE
I will START or STAY ON the medications listed below when I get home from the hospital:    ibuprofen 600 mg oral tablet  -- 1 tab(s) by mouth every 6 hours  -- Indication: For Pain    PNV Prenatal oral tablet  -- 1 tab(s) by mouth once a day  -- Indication: For supplement

## 2019-03-06 NOTE — CONSULT NOTE ADULT - ASSESSMENT
32 y/o female 38.4 weeks pregnant, , ENMA 19, LMP 18 presents admitted [facility] 3/3/19 for Cytotec Induction of labor due to polyhydramnios and baby diagnosis of Cleft lip and palate    -call plastic surgery residents and Dr Guevara once baby ia born for evaluation  -will discuss with attending

## 2019-03-06 NOTE — DISCHARGE NOTE OB - HOSPITAL COURSE
Admitted for scheduled induction of labor for polyhydramnios, fetal cleft lip/palate.  Cytotec, cervical balloon, pitocin induction.  Epidural for pain control.  Chorioamnionitis, treated with ampicillin/gentamycin.  Cat II FHT remote from delivery.  C/S 3/6/19, viable female infant to NICU for cleft lip/palate and maternal fever/chorioamnionitis.  Invanz x 24 hours post delivery.

## 2019-03-06 NOTE — DISCHARGE NOTE OB - PLAN OF CARE
return to usual activities nothing per vagina x 6 weeks, follow-up for incision check in 2 weeks, full post partum visit in 6 weeks.

## 2019-03-07 LAB
BASOPHILS # BLD AUTO: 0.03 K/UL — SIGNIFICANT CHANGE UP (ref 0–0.2)
BASOPHILS NFR BLD AUTO: 0.2 % — SIGNIFICANT CHANGE UP (ref 0–2)
EOSINOPHIL # BLD AUTO: 0 K/UL — SIGNIFICANT CHANGE UP (ref 0–0.5)
EOSINOPHIL NFR BLD AUTO: 0 % — SIGNIFICANT CHANGE UP (ref 0–6)
HCT VFR BLD CALC: 32.6 % — LOW (ref 34.5–45)
HGB BLD-MCNC: 10.7 G/DL — LOW (ref 11.5–15.5)
IMM GRANULOCYTES NFR BLD AUTO: 0.8 % — SIGNIFICANT CHANGE UP (ref 0–1.5)
LYMPHOCYTES # BLD AUTO: 0.51 K/UL — LOW (ref 1–3.3)
LYMPHOCYTES # BLD AUTO: 2.9 % — LOW (ref 13–44)
MCHC RBC-ENTMCNC: 29.6 PG — SIGNIFICANT CHANGE UP (ref 27–34)
MCHC RBC-ENTMCNC: 32.8 % — SIGNIFICANT CHANGE UP (ref 32–36)
MCV RBC AUTO: 90.3 FL — SIGNIFICANT CHANGE UP (ref 80–100)
MONOCYTES # BLD AUTO: 0.96 K/UL — HIGH (ref 0–0.9)
MONOCYTES NFR BLD AUTO: 5.5 % — SIGNIFICANT CHANGE UP (ref 2–14)
NEUTROPHILS # BLD AUTO: 15.67 K/UL — HIGH (ref 1.8–7.4)
NEUTROPHILS NFR BLD AUTO: 90.6 % — HIGH (ref 43–77)
NRBC # FLD: 0 K/UL — LOW (ref 25–125)
PLATELET # BLD AUTO: 232 K/UL — SIGNIFICANT CHANGE UP (ref 150–400)
PMV BLD: 10.1 FL — SIGNIFICANT CHANGE UP (ref 7–13)
RBC # BLD: 3.61 M/UL — LOW (ref 3.8–5.2)
RBC # FLD: 13.8 % — SIGNIFICANT CHANGE UP (ref 10.3–14.5)
WBC # BLD: 17.3 K/UL — HIGH (ref 3.8–10.5)
WBC # FLD AUTO: 17.3 K/UL — HIGH (ref 3.8–10.5)

## 2019-03-07 RX ORDER — VALACYCLOVIR 500 MG/1
500 TABLET, FILM COATED ORAL EVERY 12 HOURS
Qty: 0 | Refills: 0 | Status: DISCONTINUED | OUTPATIENT
Start: 2019-03-07 | End: 2019-03-10

## 2019-03-07 RX ORDER — OXYCODONE HYDROCHLORIDE 5 MG/1
5 TABLET ORAL EVERY 4 HOURS
Qty: 0 | Refills: 0 | Status: DISCONTINUED | OUTPATIENT
Start: 2019-03-07 | End: 2019-03-10

## 2019-03-07 RX ORDER — IBUPROFEN 200 MG
600 TABLET ORAL EVERY 6 HOURS
Qty: 0 | Refills: 0 | Status: DISCONTINUED | OUTPATIENT
Start: 2019-03-07 | End: 2019-03-10

## 2019-03-07 RX ORDER — HEPARIN SODIUM 5000 [USP'U]/ML
5000 INJECTION INTRAVENOUS; SUBCUTANEOUS EVERY 12 HOURS
Qty: 0 | Refills: 0 | Status: DISCONTINUED | OUTPATIENT
Start: 2019-03-07 | End: 2019-03-10

## 2019-03-07 RX ORDER — OXYCODONE HYDROCHLORIDE 5 MG/1
5 TABLET ORAL
Qty: 0 | Refills: 0 | Status: DISCONTINUED | OUTPATIENT
Start: 2019-03-07 | End: 2019-03-10

## 2019-03-07 RX ADMIN — OXYCODONE HYDROCHLORIDE 5 MILLIGRAM(S): 5 TABLET ORAL at 17:00

## 2019-03-07 RX ADMIN — Medication 975 MILLIGRAM(S): at 14:58

## 2019-03-07 RX ADMIN — Medication 600 MILLIGRAM(S): at 03:30

## 2019-03-07 RX ADMIN — Medication 975 MILLIGRAM(S): at 21:22

## 2019-03-07 RX ADMIN — Medication 975 MILLIGRAM(S): at 20:21

## 2019-03-07 RX ADMIN — OXYCODONE HYDROCHLORIDE 5 MILLIGRAM(S): 5 TABLET ORAL at 15:59

## 2019-03-07 RX ADMIN — Medication 100 MILLIGRAM(S): at 08:33

## 2019-03-07 RX ADMIN — HEPARIN SODIUM 5000 UNIT(S): 5000 INJECTION INTRAVENOUS; SUBCUTANEOUS at 16:06

## 2019-03-07 RX ADMIN — Medication 975 MILLIGRAM(S): at 06:30

## 2019-03-07 RX ADMIN — HEPARIN SODIUM 5000 UNIT(S): 5000 INJECTION INTRAVENOUS; SUBCUTANEOUS at 06:29

## 2019-03-07 RX ADMIN — Medication 600 MILLIGRAM(S): at 13:58

## 2019-03-07 RX ADMIN — Medication 100 MILLIGRAM(S): at 04:08

## 2019-03-07 RX ADMIN — Medication 600 MILLIGRAM(S): at 09:36

## 2019-03-07 RX ADMIN — Medication 100 MILLIGRAM(S): at 20:28

## 2019-03-07 RX ADMIN — Medication 600 MILLIGRAM(S): at 20:21

## 2019-03-07 RX ADMIN — SIMETHICONE 80 MILLIGRAM(S): 80 TABLET, CHEWABLE ORAL at 15:59

## 2019-03-07 RX ADMIN — OXYCODONE HYDROCHLORIDE 5 MILLIGRAM(S): 5 TABLET ORAL at 08:33

## 2019-03-07 RX ADMIN — OXYCODONE HYDROCHLORIDE 5 MILLIGRAM(S): 5 TABLET ORAL at 09:36

## 2019-03-07 RX ADMIN — Medication 600 MILLIGRAM(S): at 03:00

## 2019-03-07 RX ADMIN — OXYCODONE HYDROCHLORIDE 5 MILLIGRAM(S): 5 TABLET ORAL at 06:30

## 2019-03-07 RX ADMIN — OXYCODONE HYDROCHLORIDE 5 MILLIGRAM(S): 5 TABLET ORAL at 06:04

## 2019-03-07 RX ADMIN — Medication 600 MILLIGRAM(S): at 14:58

## 2019-03-07 RX ADMIN — OXYCODONE HYDROCHLORIDE 5 MILLIGRAM(S): 5 TABLET ORAL at 23:41

## 2019-03-07 RX ADMIN — ERTAPENEM SODIUM 120 MILLIGRAM(S): 1 INJECTION, POWDER, LYOPHILIZED, FOR SOLUTION INTRAMUSCULAR; INTRAVENOUS at 00:06

## 2019-03-07 RX ADMIN — Medication 600 MILLIGRAM(S): at 21:22

## 2019-03-07 RX ADMIN — Medication 600 MILLIGRAM(S): at 08:33

## 2019-03-07 RX ADMIN — Medication 975 MILLIGRAM(S): at 06:04

## 2019-03-07 RX ADMIN — Medication 975 MILLIGRAM(S): at 13:58

## 2019-03-07 RX ADMIN — VALACYCLOVIR 500 MILLIGRAM(S): 500 TABLET, FILM COATED ORAL at 13:56

## 2019-03-07 NOTE — PROVIDER CONTACT NOTE (OTHER) - ASSESSMENT
Pt. IV fell out and temp/rest of VS WNL. Refer to sunrise VS. Pt. denies any light headedness/dizziness. Pt. fundus 1F below and bleeding moderate. Pt. states her nose is stuffy.

## 2019-03-07 NOTE — PROVIDER CONTACT NOTE (OTHER) - BACKGROUND
Primary c/s r/t cat II tracing from 3/6/2019 @ 2050. Parity 1001. . Gestation 39.3 weeks. O+ blood type. GBS negative from 2/11.

## 2019-03-07 NOTE — PROVIDER CONTACT NOTE (OTHER) - SITUATION
Pt. IV fell out and temp WNL. Questioning whether to give last dose of Invanz and reinsert new IV or not.

## 2019-03-07 NOTE — PROVIDER CONTACT NOTE (OTHER) - ACTION/TREATMENT ORDERED:
MD Tiffany Dutta aware. MD states Invanz is no longer indicated. MD will d/c Invanz order. Will continue to monitor.

## 2019-03-07 NOTE — PROGRESS NOTE ADULT - PROBLEM SELECTOR PLAN 1
- Continue regular diet.  - Increase ambulation.  - Continue motrin, tylenol, oxycodone PRN for pain control.    - F/u AM CBC  -SW will see for history of anxiety  -monitor temperature    Mick Dove PGY-1

## 2019-03-07 NOTE — PROGRESS NOTE ADULT - ASSESSMENT
A/P: 34yo POD#1 s/p LTCS for NRFHT c/b IPT s/p A/G/T has been afebrile since. Patient is stable and doing well post-operatively.

## 2019-03-08 RX ORDER — MAGNESIUM HYDROXIDE 400 MG/1
30 TABLET, CHEWABLE ORAL DAILY
Qty: 0 | Refills: 0 | Status: DISCONTINUED | OUTPATIENT
Start: 2019-03-08 | End: 2019-03-10

## 2019-03-08 RX ORDER — SODIUM CHLORIDE 0.65 %
1 AEROSOL, SPRAY (ML) NASAL THREE TIMES A DAY
Qty: 0 | Refills: 0 | Status: DISCONTINUED | OUTPATIENT
Start: 2019-03-08 | End: 2019-03-10

## 2019-03-08 RX ADMIN — Medication 600 MILLIGRAM(S): at 22:22

## 2019-03-08 RX ADMIN — OXYCODONE HYDROCHLORIDE 5 MILLIGRAM(S): 5 TABLET ORAL at 04:52

## 2019-03-08 RX ADMIN — Medication 600 MILLIGRAM(S): at 04:52

## 2019-03-08 RX ADMIN — Medication 1 SPRAY(S): at 09:43

## 2019-03-08 RX ADMIN — Medication 975 MILLIGRAM(S): at 09:40

## 2019-03-08 RX ADMIN — Medication 975 MILLIGRAM(S): at 22:22

## 2019-03-08 RX ADMIN — OXYCODONE HYDROCHLORIDE 5 MILLIGRAM(S): 5 TABLET ORAL at 22:00

## 2019-03-08 RX ADMIN — Medication 600 MILLIGRAM(S): at 09:40

## 2019-03-08 RX ADMIN — OXYCODONE HYDROCHLORIDE 5 MILLIGRAM(S): 5 TABLET ORAL at 10:21

## 2019-03-08 RX ADMIN — Medication 975 MILLIGRAM(S): at 04:52

## 2019-03-08 RX ADMIN — Medication 600 MILLIGRAM(S): at 21:22

## 2019-03-08 RX ADMIN — HEPARIN SODIUM 5000 UNIT(S): 5000 INJECTION INTRAVENOUS; SUBCUTANEOUS at 05:35

## 2019-03-08 RX ADMIN — Medication 600 MILLIGRAM(S): at 03:52

## 2019-03-08 RX ADMIN — Medication 1 SPRAY(S): at 21:26

## 2019-03-08 RX ADMIN — SIMETHICONE 80 MILLIGRAM(S): 80 TABLET, CHEWABLE ORAL at 15:15

## 2019-03-08 RX ADMIN — SIMETHICONE 80 MILLIGRAM(S): 80 TABLET, CHEWABLE ORAL at 04:06

## 2019-03-08 RX ADMIN — Medication 600 MILLIGRAM(S): at 15:16

## 2019-03-08 RX ADMIN — Medication 100 MILLIGRAM(S): at 21:26

## 2019-03-08 RX ADMIN — SIMETHICONE 80 MILLIGRAM(S): 80 TABLET, CHEWABLE ORAL at 09:39

## 2019-03-08 RX ADMIN — OXYCODONE HYDROCHLORIDE 5 MILLIGRAM(S): 5 TABLET ORAL at 03:52

## 2019-03-08 RX ADMIN — Medication 100 MILLIGRAM(S): at 09:40

## 2019-03-08 RX ADMIN — OXYCODONE HYDROCHLORIDE 5 MILLIGRAM(S): 5 TABLET ORAL at 21:22

## 2019-03-08 RX ADMIN — Medication 200 MILLIGRAM(S): at 21:23

## 2019-03-08 RX ADMIN — MAGNESIUM HYDROXIDE 30 MILLILITER(S): 400 TABLET, CHEWABLE ORAL at 15:16

## 2019-03-08 RX ADMIN — Medication 975 MILLIGRAM(S): at 21:22

## 2019-03-08 RX ADMIN — OXYCODONE HYDROCHLORIDE 5 MILLIGRAM(S): 5 TABLET ORAL at 16:15

## 2019-03-08 RX ADMIN — Medication 975 MILLIGRAM(S): at 16:15

## 2019-03-08 RX ADMIN — OXYCODONE HYDROCHLORIDE 5 MILLIGRAM(S): 5 TABLET ORAL at 00:05

## 2019-03-08 RX ADMIN — OXYCODONE HYDROCHLORIDE 5 MILLIGRAM(S): 5 TABLET ORAL at 15:16

## 2019-03-08 RX ADMIN — Medication 975 MILLIGRAM(S): at 03:52

## 2019-03-08 RX ADMIN — OXYCODONE HYDROCHLORIDE 5 MILLIGRAM(S): 5 TABLET ORAL at 09:40

## 2019-03-08 RX ADMIN — HEPARIN SODIUM 5000 UNIT(S): 5000 INJECTION INTRAVENOUS; SUBCUTANEOUS at 15:21

## 2019-03-08 RX ADMIN — Medication 200 MILLIGRAM(S): at 15:21

## 2019-03-08 RX ADMIN — Medication 600 MILLIGRAM(S): at 16:15

## 2019-03-08 RX ADMIN — Medication 975 MILLIGRAM(S): at 15:16

## 2019-03-08 RX ADMIN — Medication 600 MILLIGRAM(S): at 10:21

## 2019-03-08 RX ADMIN — Medication 200 MILLIGRAM(S): at 05:49

## 2019-03-08 RX ADMIN — Medication 975 MILLIGRAM(S): at 10:21

## 2019-03-08 NOTE — LACTATION INITIAL EVALUATION - NS LACT CON REASON FOR REQ
general questions without assessment/Discussed double pumping and manual expression for infant in NICU.

## 2019-03-09 LAB
B PERT DNA SPEC QL NAA+PROBE: NOT DETECTED — SIGNIFICANT CHANGE UP
C PNEUM DNA SPEC QL NAA+PROBE: NOT DETECTED — SIGNIFICANT CHANGE UP
FLUAV H1 2009 PAND RNA SPEC QL NAA+PROBE: NOT DETECTED — SIGNIFICANT CHANGE UP
FLUAV H1 RNA SPEC QL NAA+PROBE: NOT DETECTED — SIGNIFICANT CHANGE UP
FLUAV H3 RNA SPEC QL NAA+PROBE: NOT DETECTED — SIGNIFICANT CHANGE UP
FLUAV SUBTYP SPEC NAA+PROBE: NOT DETECTED — SIGNIFICANT CHANGE UP
FLUBV RNA SPEC QL NAA+PROBE: NOT DETECTED — SIGNIFICANT CHANGE UP
HADV DNA SPEC QL NAA+PROBE: NOT DETECTED — SIGNIFICANT CHANGE UP
HCOV PNL SPEC NAA+PROBE: SIGNIFICANT CHANGE UP
HMPV RNA SPEC QL NAA+PROBE: DETECTED — HIGH
HPIV1 RNA SPEC QL NAA+PROBE: NOT DETECTED — SIGNIFICANT CHANGE UP
HPIV2 RNA SPEC QL NAA+PROBE: NOT DETECTED — SIGNIFICANT CHANGE UP
HPIV3 RNA SPEC QL NAA+PROBE: NOT DETECTED — SIGNIFICANT CHANGE UP
HPIV4 RNA SPEC QL NAA+PROBE: NOT DETECTED — SIGNIFICANT CHANGE UP
RSV RNA SPEC QL NAA+PROBE: NOT DETECTED — SIGNIFICANT CHANGE UP
RV+EV RNA SPEC QL NAA+PROBE: NOT DETECTED — SIGNIFICANT CHANGE UP

## 2019-03-09 RX ADMIN — Medication 325 MILLIGRAM(S): at 13:58

## 2019-03-09 RX ADMIN — Medication 975 MILLIGRAM(S): at 10:12

## 2019-03-09 RX ADMIN — Medication 975 MILLIGRAM(S): at 11:00

## 2019-03-09 RX ADMIN — Medication 975 MILLIGRAM(S): at 04:43

## 2019-03-09 RX ADMIN — OXYCODONE HYDROCHLORIDE 5 MILLIGRAM(S): 5 TABLET ORAL at 09:00

## 2019-03-09 RX ADMIN — SIMETHICONE 80 MILLIGRAM(S): 80 TABLET, CHEWABLE ORAL at 13:59

## 2019-03-09 RX ADMIN — OXYCODONE HYDROCHLORIDE 5 MILLIGRAM(S): 5 TABLET ORAL at 01:02

## 2019-03-09 RX ADMIN — OXYCODONE HYDROCHLORIDE 5 MILLIGRAM(S): 5 TABLET ORAL at 04:40

## 2019-03-09 RX ADMIN — Medication 975 MILLIGRAM(S): at 03:44

## 2019-03-09 RX ADMIN — Medication 100 MILLIGRAM(S): at 13:59

## 2019-03-09 RX ADMIN — Medication 200 MILLIGRAM(S): at 10:15

## 2019-03-09 RX ADMIN — OXYCODONE HYDROCHLORIDE 5 MILLIGRAM(S): 5 TABLET ORAL at 18:00

## 2019-03-09 RX ADMIN — HEPARIN SODIUM 5000 UNIT(S): 5000 INJECTION INTRAVENOUS; SUBCUTANEOUS at 17:02

## 2019-03-09 RX ADMIN — Medication 600 MILLIGRAM(S): at 10:11

## 2019-03-09 RX ADMIN — Medication 600 MILLIGRAM(S): at 17:03

## 2019-03-09 RX ADMIN — OXYCODONE HYDROCHLORIDE 5 MILLIGRAM(S): 5 TABLET ORAL at 01:30

## 2019-03-09 RX ADMIN — Medication 600 MILLIGRAM(S): at 23:20

## 2019-03-09 RX ADMIN — Medication 975 MILLIGRAM(S): at 23:19

## 2019-03-09 RX ADMIN — OXYCODONE HYDROCHLORIDE 5 MILLIGRAM(S): 5 TABLET ORAL at 13:58

## 2019-03-09 RX ADMIN — OXYCODONE HYDROCHLORIDE 5 MILLIGRAM(S): 5 TABLET ORAL at 17:02

## 2019-03-09 RX ADMIN — Medication 975 MILLIGRAM(S): at 17:02

## 2019-03-09 RX ADMIN — Medication 975 MILLIGRAM(S): at 18:00

## 2019-03-09 RX ADMIN — OXYCODONE HYDROCHLORIDE 5 MILLIGRAM(S): 5 TABLET ORAL at 05:10

## 2019-03-09 RX ADMIN — OXYCODONE HYDROCHLORIDE 5 MILLIGRAM(S): 5 TABLET ORAL at 08:12

## 2019-03-09 RX ADMIN — Medication 600 MILLIGRAM(S): at 11:00

## 2019-03-09 RX ADMIN — MAGNESIUM HYDROXIDE 30 MILLILITER(S): 400 TABLET, CHEWABLE ORAL at 09:00

## 2019-03-09 RX ADMIN — SIMETHICONE 80 MILLIGRAM(S): 80 TABLET, CHEWABLE ORAL at 03:46

## 2019-03-09 RX ADMIN — Medication 200 MILLIGRAM(S): at 03:46

## 2019-03-09 RX ADMIN — HEPARIN SODIUM 5000 UNIT(S): 5000 INJECTION INTRAVENOUS; SUBCUTANEOUS at 04:40

## 2019-03-09 RX ADMIN — Medication 600 MILLIGRAM(S): at 18:00

## 2019-03-09 RX ADMIN — Medication 600 MILLIGRAM(S): at 04:43

## 2019-03-09 RX ADMIN — Medication 600 MILLIGRAM(S): at 03:45

## 2019-03-09 RX ADMIN — OXYCODONE HYDROCHLORIDE 5 MILLIGRAM(S): 5 TABLET ORAL at 23:20

## 2019-03-09 RX ADMIN — Medication 1 TABLET(S): at 13:58

## 2019-03-09 RX ADMIN — Medication 200 MILLIGRAM(S): at 17:02

## 2019-03-09 NOTE — PROVIDER CONTACT NOTE (OTHER) - ACTION/TREATMENT ORDERED:
GURMEET Donaldson aware. RSV swab swabbed on pt. Results as per sunrise. Will continue to monitor. GURMEET Donaldson aware. RVP swab swabbed on pt. Results as per sunrise. Will continue to monitor.

## 2019-03-09 NOTE — PROVIDER CONTACT NOTE (OTHER) - BACKGROUND
Primary c/s on 3/6/2019 r/t cat II tracing. Parity 1001. . 39.4 weeks gestation. O+ blood type. GBS negative 2/11.

## 2019-03-09 NOTE — PROVIDER CONTACT NOTE (OTHER) - SITUATION
Pt. states she has been seeing black spots and has green mucousy cough Pt. states she has been seeing black spots and has green mucousy cough.

## 2019-03-09 NOTE — PROVIDER CONTACT NOTE (OTHER) - ASSESSMENT
Pt. states she has been seeing black spots and has green mucousy cough. Pt. states she started getting green mucousy cough on 3/8/2019 @ around 0600/0700. Pt. states she has been seeing black spots for past few days, she states her doctor is aware. Pt. states she has a sore throat and stuffy nose. Pt. states she feels pain and aches around her body.

## 2019-03-10 VITALS
RESPIRATION RATE: 18 BRPM | HEART RATE: 70 BPM | OXYGEN SATURATION: 96 % | TEMPERATURE: 98 F | SYSTOLIC BLOOD PRESSURE: 114 MMHG | DIASTOLIC BLOOD PRESSURE: 65 MMHG

## 2019-03-10 RX ADMIN — OXYCODONE HYDROCHLORIDE 5 MILLIGRAM(S): 5 TABLET ORAL at 00:00

## 2019-03-10 RX ADMIN — Medication 600 MILLIGRAM(S): at 11:50

## 2019-03-10 RX ADMIN — SIMETHICONE 80 MILLIGRAM(S): 80 TABLET, CHEWABLE ORAL at 11:23

## 2019-03-10 RX ADMIN — Medication 600 MILLIGRAM(S): at 06:15

## 2019-03-10 RX ADMIN — Medication 100 MILLIGRAM(S): at 11:22

## 2019-03-10 RX ADMIN — Medication 975 MILLIGRAM(S): at 00:00

## 2019-03-10 RX ADMIN — HEPARIN SODIUM 5000 UNIT(S): 5000 INJECTION INTRAVENOUS; SUBCUTANEOUS at 05:43

## 2019-03-10 RX ADMIN — Medication 975 MILLIGRAM(S): at 11:50

## 2019-03-10 RX ADMIN — Medication 200 MILLIGRAM(S): at 05:47

## 2019-03-10 RX ADMIN — Medication 600 MILLIGRAM(S): at 00:00

## 2019-03-10 RX ADMIN — Medication 975 MILLIGRAM(S): at 11:14

## 2019-03-10 RX ADMIN — Medication 200 MILLIGRAM(S): at 11:23

## 2019-03-10 RX ADMIN — Medication 600 MILLIGRAM(S): at 11:14

## 2019-03-10 RX ADMIN — Medication 600 MILLIGRAM(S): at 05:43

## 2019-03-10 RX ADMIN — OXYCODONE HYDROCHLORIDE 5 MILLIGRAM(S): 5 TABLET ORAL at 06:15

## 2019-03-10 RX ADMIN — Medication 975 MILLIGRAM(S): at 05:42

## 2019-03-10 RX ADMIN — OXYCODONE HYDROCHLORIDE 5 MILLIGRAM(S): 5 TABLET ORAL at 11:14

## 2019-03-10 RX ADMIN — OXYCODONE HYDROCHLORIDE 5 MILLIGRAM(S): 5 TABLET ORAL at 05:46

## 2019-03-10 RX ADMIN — OXYCODONE HYDROCHLORIDE 5 MILLIGRAM(S): 5 TABLET ORAL at 11:50

## 2019-03-10 RX ADMIN — MAGNESIUM HYDROXIDE 30 MILLILITER(S): 400 TABLET, CHEWABLE ORAL at 09:03

## 2019-03-10 RX ADMIN — Medication 975 MILLIGRAM(S): at 05:15

## 2019-03-10 NOTE — PROGRESS NOTE ADULT - SUBJECTIVE AND OBJECTIVE BOX
Postpartum Note,  Section  She is a  33y woman who is now post-operative day: 1    Subjective:  The patient feels well.  She is ambulating.   She is tolerating regular diet.  She denies nausea and vomiting.  She is voiding.  Her pain is controlled.  She reports normal postpartum bleeding    Physical exam:    Vital Signs Last 24 Hrs  T(C): 36.3 (07 Mar 2019 14:42), Max: 36.9 (06 Mar 2019 21:46)  T(F): 97.4 (07 Mar 2019 14:42), Max: 98.4 (06 Mar 2019 21:46)  HR: 68 (07 Mar 2019 14:42) (68 - 95)  BP: 112/67 (07 Mar 2019 14:42) (85/40 - 133/62)  BP(mean): 78 (06 Mar 2019 23:50) (49 - 104)  RR: 18 (07 Mar 2019 14:42) (17 - 22)  SpO2: 97% (07 Mar 2019 14:42) (90% - 97%)    Gen: NAD  Breast: Soft, nontender, not engorged.  Abdomen: Soft, nontender, no distension , firm uterine fundus at umbilicus.  Incision: Clean, dry, and intact  Pelvic: Normal lochia noted  Ext: No calf tenderness    LABS:                        10.7   17.30 )-----------( 232      ( 07 Mar 2019 05:45 )             32.6       Rubella status:     Allergies    No Known Allergies    Intolerances      MEDICATIONS  (STANDING):  acetaminophen   Tablet .. 975 milliGRAM(s) Oral every 6 hours  diphtheria/tetanus/pertussis (acellular) Vaccine (ADAcel) 0.5 milliLiter(s) IntraMuscular once  ertapenem  IVPB 1000 milliGRAM(s) IV Intermittent every 24 hours  ferrous    sulfate 325 milliGRAM(s) Oral daily  heparin  Injectable 5000 Unit(s) SubCutaneous every 12 hours  ibuprofen  Tablet. 600 milliGRAM(s) Oral every 6 hours  oxyCODONE    IR 5 milliGRAM(s) Oral every 3 hours  prenatal multivitamin 1 Tablet(s) Oral daily  valACYclovir 500 milliGRAM(s) Oral every 12 hours    MEDICATIONS  (PRN):  diphenhydrAMINE 25 milliGRAM(s) Oral every 6 hours PRN Itching  docusate sodium 100 milliGRAM(s) Oral two times a day PRN Stool Softening  glycerin Suppository - Adult 1 Suppository(s) Rectal at bedtime PRN Constipation  lanolin Ointment 1 Application(s) Topical every 3 hours PRN Sore Nipples  oxyCODONE    IR 5 milliGRAM(s) Oral every 4 hours PRN Severe Pain (7 - 10)  simethicone 80 milliGRAM(s) Chew every 4 hours PRN Gas        Assessment and Plan  POD #1 s/p  section  Doing well.  Encourage ambulation.
ANESTHESIA POSTOP CHECK    33y Female POSTOP DAY 1     No COMPLAINTS    NO APPARENT ANESTHESIA COMPLICATIONS
OB Progress Note:  Delivery, POD#1    S: 34yo POD#1 s/p LTCS for NRFHT c/b IPT s/p A/G/T has been afebrile since. Her pain is well controlled. She is tolerating a regular diet and passing flatus. Denies N/V. Denies CP/SOB/lightheadedness/dizziness.   She is ambulating without difficulty.   Voiding spontaneously  Denies heavy vaginal bleeding.    O:   Vital Signs Last 24 Hrs  T(C): 36.7 (07 Mar 2019 06:40), Max: 36.9 (06 Mar 2019 21:46)  T(F): 98.1 (07 Mar 2019 06:40), Max: 98.4 (06 Mar 2019 21:46)  HR: 69 (07 Mar 2019 06:40) (68 - 95)  BP: 117/56 (07 Mar 2019 06:40) (85/40 - 133/62)  BP(mean): 78 (06 Mar 2019 23:50) (49 - 104)  RR: 18 (07 Mar 2019 06:40) (17 - 22)  SpO2: 97% (07 Mar 2019 06:40) (90% - 97%)    Labs:  Blood type: O Positive  Rubella IgG: RPR: Negative                          10.7<L>   17.30<H> >-----------< 232    (  @ 05:45 )             32.6<L>                  PE:  General: NAD  Abdomen: Mildly distended, appropriately tender, incision c/d/i.  Extremities: No erythema, no pitting edema
POST OP DAY  1  s/p   SECTION    SUBJECTIVE:  I'm ok.    PAIN SCALE SCORE: [x] Refer to charted pain scores    THERAPY:  [  ] Spinal morphine   [ x ] Epidural morphine   [  ] IV PCA Hydromorphone 1 mg/ml    OBJECTIVE:  Comfortable Appearing    SEDATION SCORE:	  [ x ] Alert	    [  ] Drowsy        [  ] Arousable	[  ] Asleep	[  ] Unresponsive    Side Effects:	  [ x ] None	     [  ] Nausea        [  ] Pruritus        [  ] Weakness   [  ] Numbness        ASSESSMENT/ PLAN   [   ] Discontinue         [  ] Continue    [ x ] Change to prn Analgesics as per primary service.    DOCUMENTATION & VERIFICATION OF CURRENT MEDS [ x ] Done    COMMENTS: No Headache.
S: Patient reporting of headache. Denies other symptoms associated with PEC.     O: Vital Signs Last 24 Hrs  T(C): 36.7 (10 Mar 2019 06:21), Max: 36.8 (09 Mar 2019 14:10)  T(F): 98.1 (10 Mar 2019 06:21), Max: 98.3 (09 Mar 2019 14:10)  HR: 70 (10 Mar 2019 06:21) (70 - 84)  BP: 114/65 (10 Mar 2019 06:21) (114/65 - 139/78)  BP(mean): --  RR: 18 (10 Mar 2019 06:21) (18 - 18)  SpO2: 96% (10 Mar 2019 06:21) (96% - 100%)    Gen: NAD  Abd: soft, NT, ND, fundus firm below umbilicus  Lochia: moderate  Incision: well approximated, clean, dry, intact,   Ext: no tenderness, edema present    Medications:  acetaminophen   Tablet .. 975 milliGRAM(s) Oral every 6 hours  diphenhydrAMINE 25 milliGRAM(s) Oral every 6 hours PRN  diphtheria/tetanus/pertussis (acellular) Vaccine (ADAcel) 0.5 milliLiter(s) IntraMuscular once  docusate sodium 100 milliGRAM(s) Oral two times a day PRN  ferrous    sulfate 325 milliGRAM(s) Oral daily  glycerin Suppository - Adult 1 Suppository(s) Rectal at bedtime PRN  guaiFENesin    Syrup 200 milliGRAM(s) Oral every 6 hours PRN  heparin  Injectable 5000 Unit(s) SubCutaneous every 12 hours  ibuprofen  Tablet. 600 milliGRAM(s) Oral every 6 hours  lanolin Ointment 1 Application(s) Topical every 3 hours PRN  magnesium hydroxide Suspension 30 milliLiter(s) Oral daily PRN  oxyCODONE    IR 5 milliGRAM(s) Oral every 3 hours  oxyCODONE    IR 5 milliGRAM(s) Oral every 4 hours PRN  prenatal multivitamin 1 Tablet(s) Oral daily  simethicone 80 milliGRAM(s) Chew every 4 hours PRN  sodium chloride 0.65% Nasal 1 Spray(s) Both Nostrils three times a day PRN  valACYclovir 500 milliGRAM(s) Oral every 12 hours    Labs:      A: 33y PPD#4 s/p C/S,, will reassess headache.     Plan: Continue routine postpartum care. Anticipate d/c home today, if headache resolves, discussed with 
SUBJECTIVE:    Pain: Controlled    Complaints:  C/O Nasal Congestion    MILESTONES:    Alert and Oriented x 3  [ x ]  Out of bed/ ambulating. [ x ]  Flatus:   Positive [ x ]  Negative [  ]  Bowel movement  [  ] Positive [  ] Negative   Voiding [x  ] Due to void [  ]   Marte/Indwelling catheter in place [  ]  Diet: Regular [ x ]  Clears [  ]  NPO [  ]    Infant feeding:  Breast [  ]   Bottle [  ]  Both [  ]  Feeding related issues and/or concerns:      OBJECTIVE:  T(C): 36.3 (19 @ 05:00), Max: 36.3 (19 @ 14:42)  HR: 74 (19 @ 05:00) (68 - 78)  BP: 121/65 (19 @ 05:00) (112/67 - 122/74)  RR: 19 (19 @ 05:00) (18 - 19)  SpO2: 97% (19 @ 05:00) (97% - 99%)  Wt(kg): --                        10.7   17.30 )-----------( 232      ( 07 Mar 2019 05:45 )             32.6           Blood Type: O Positive    RPR: Negative          MEDICATIONS  (STANDING):  acetaminophen   Tablet .. 975 milliGRAM(s) Oral every 6 hours  diphtheria/tetanus/pertussis (acellular) Vaccine (ADAcel) 0.5 milliLiter(s) IntraMuscular once  ferrous    sulfate 325 milliGRAM(s) Oral daily  heparin  Injectable 5000 Unit(s) SubCutaneous every 12 hours  ibuprofen  Tablet. 600 milliGRAM(s) Oral every 6 hours  oxyCODONE    IR 5 milliGRAM(s) Oral every 3 hours  prenatal multivitamin 1 Tablet(s) Oral daily  valACYclovir 500 milliGRAM(s) Oral every 12 hours    MEDICATIONS  (PRN):  diphenhydrAMINE 25 milliGRAM(s) Oral every 6 hours PRN Itching  docusate sodium 100 milliGRAM(s) Oral two times a day PRN Stool Softening  glycerin Suppository - Adult 1 Suppository(s) Rectal at bedtime PRN Constipation  guaiFENesin    Syrup 200 milliGRAM(s) Oral every 6 hours PRN Cough  lanolin Ointment 1 Application(s) Topical every 3 hours PRN Sore Nipples  oxyCODONE    IR 5 milliGRAM(s) Oral every 4 hours PRN Severe Pain (7 - 10)  simethicone 80 milliGRAM(s) Chew every 4 hours PRN Gas  sodium chloride 0.65% Nasal 1 Spray(s) Both Nostrils three times a day PRN Nasal Congestion        ASSESSMENT:    33y     G  1    P  1001       PO Day#  2        Delivery: Primary [X  ]    Repeat [  ]    EBL - 800   S/P IPT --> Abx.                                  Indication of procedure: Abnormal Fetal Status, Arrest    Condition: Stable    Past Medical History significant for: HPI:  34 y/o female 38.4 weeks pregnant, , ENMA 19, LMP 18 presents to PST for pre op evaluation in preparation for Cytotec Induction of labor on 19 due to polyhydramnios and  Cleft lip and palate as per OB GYN Dr. Hamlin.  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    Vaginal: Lochia:  Heavy [  ]  Moderate [ x ]   Scant [  ]    Extremities: Edema [X  ] Negative Davion's Sign [X  ] Nontender Rafita  [ x ] Positive pedal pulses [  ]    Other relevant physical exam findings:      PLAN:    Plan: Increase ambulation, analgesia PRN and pain medication protocol standing oxycodone, ibuprofen and acetaminophen.    Diet: Regular diet    Continue routine post-operative and postpartum care.  Saline nasal spray  and  Robitussin  PRN.     Discharge Planning [ x ]    For discharge Today  [    ]    Consults:  Social Work [X  ]  Lactation [ x ]  Other [         ]
SUBJECTIVE:    Pain: Controlled    Complaints:  URI Symptoms    MILESTONES:    Alert and Oriented x 3  [ x ]  Out of bed/ ambulating. [ x ]  Flatus:   Positive [ x ]  Negative [  ]  Bowel movement  [  ] Positive [  ] Negative   Voiding [x  ] Due to void [  ]   Marte/Indwelling catheter in place [  ]  Diet: Regular [ x ]  Clears [  ]  NPO [  ]    Infant feeding:  Breast [  ]   Bottle [  ]  Both [  ]  Feeding related issues and/or concerns:      OBJECTIVE:  T(C): 36.7 (19 @ 06:17), Max: 36.7 (19 @ 22:05)  HR: 84 (19 @ 06:17) (75 - 88)  BP: 113/64 (19 @ 06:17) (113/64 - 131/71)  RR: 16 (19 @ 06:17) (16 - 18)  SpO2: 96% (19 @ 06:17) (96% - 100%)  Wt(kg): --          Blood Type: O Positive    RPR: Negative          MEDICATIONS  (STANDING):  acetaminophen   Tablet .. 975 milliGRAM(s) Oral every 6 hours  diphtheria/tetanus/pertussis (acellular) Vaccine (ADAcel) 0.5 milliLiter(s) IntraMuscular once  ferrous    sulfate 325 milliGRAM(s) Oral daily  heparin  Injectable 5000 Unit(s) SubCutaneous every 12 hours  ibuprofen  Tablet. 600 milliGRAM(s) Oral every 6 hours  oxyCODONE    IR 5 milliGRAM(s) Oral every 3 hours  prenatal multivitamin 1 Tablet(s) Oral daily  valACYclovir 500 milliGRAM(s) Oral every 12 hours    MEDICATIONS  (PRN):  diphenhydrAMINE 25 milliGRAM(s) Oral every 6 hours PRN Itching  docusate sodium 100 milliGRAM(s) Oral two times a day PRN Stool Softening  glycerin Suppository - Adult 1 Suppository(s) Rectal at bedtime PRN Constipation  guaiFENesin    Syrup 200 milliGRAM(s) Oral every 6 hours PRN Cough  lanolin Ointment 1 Application(s) Topical every 3 hours PRN Sore Nipples  magnesium hydroxide Suspension 30 milliLiter(s) Oral daily PRN Constipation  oxyCODONE    IR 5 milliGRAM(s) Oral every 4 hours PRN Severe Pain (7 - 10)  simethicone 80 milliGRAM(s) Chew every 4 hours PRN Gas  sodium chloride 0.65% Nasal 1 Spray(s) Both Nostrils three times a day PRN Nasal Congestion        ASSESSMENT:    33y     G   1   P  1001       PO Day#  3        Delivery: Primary [ X ]    Repeat [  ]       EBL - 800, s/p IPT --> Abx                                  Indication of procedure: Abnormal Fetal Status, Arrest    Condition: Stable    Past Medical History significant for: HPI:  32 y/o female 38.4 weeks pregnant, , ENMA 19, LMP 18 presents to PST for pre op evaluation in preparation for Cytotec Induction of labor on 19 due to polyhydramnios and  Cleft lip and palate as per OB GYN Dr. Hamlin.  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    Vaginal: Lochia:  Heavy [  ]  Moderate [ x ]   Scant [  ]    Extremities: Edema [X  ] Negative Davion's Sign [ X ] Nontender Rafita  [ x ] Positive pedal pulses [  ]    Other relevant physical exam findings:  (+) RVP      PLAN:    Plan: Increase ambulation, analgesia PRN and pain medication protocol standing oxycodone, ibuprofen and acetaminophen.    Diet: Regular diet    Continue routine post-operative and postpartum care.  Continue Robitussin, and saline nasal spray PRN.    Discharge Planning [ x ]    For discharge Today  [  X  ]    Consults:  Social Work [  ]  Lactation [ x ]  Other [         ]

## 2020-03-11 ENCOUNTER — APPOINTMENT (OUTPATIENT)
Dept: ORTHOPEDIC SURGERY | Facility: CLINIC | Age: 35
End: 2020-03-11

## 2020-10-19 ENCOUNTER — RESULT REVIEW (OUTPATIENT)
Age: 35
End: 2020-10-19

## 2020-11-23 ENCOUNTER — APPOINTMENT (OUTPATIENT)
Dept: MATERNAL FETAL MEDICINE | Facility: CLINIC | Age: 35
End: 2020-11-23

## 2020-11-23 ENCOUNTER — APPOINTMENT (OUTPATIENT)
Dept: ANTEPARTUM | Facility: CLINIC | Age: 35
End: 2020-11-23
Payer: COMMERCIAL

## 2020-11-23 ENCOUNTER — ASOB RESULT (OUTPATIENT)
Age: 35
End: 2020-11-23

## 2020-11-23 PROCEDURE — 76813 OB US NUCHAL MEAS 1 GEST: CPT

## 2020-11-23 PROCEDURE — 76801 OB US < 14 WKS SINGLE FETUS: CPT

## 2020-11-23 PROCEDURE — 36416 COLLJ CAPILLARY BLOOD SPEC: CPT

## 2020-12-14 ENCOUNTER — NON-APPOINTMENT (OUTPATIENT)
Age: 35
End: 2020-12-14

## 2021-01-21 ENCOUNTER — ASOB RESULT (OUTPATIENT)
Age: 36
End: 2021-01-21

## 2021-01-21 ENCOUNTER — APPOINTMENT (OUTPATIENT)
Dept: ANTEPARTUM | Facility: CLINIC | Age: 36
End: 2021-01-21
Payer: COMMERCIAL

## 2021-01-21 PROCEDURE — 76811 OB US DETAILED SNGL FETUS: CPT

## 2021-01-21 PROCEDURE — 99213 OFFICE O/P EST LOW 20 MIN: CPT | Mod: 25

## 2021-01-21 PROCEDURE — 99072 ADDL SUPL MATRL&STAF TM PHE: CPT

## 2021-03-16 ENCOUNTER — ASOB RESULT (OUTPATIENT)
Age: 36
End: 2021-03-16

## 2021-03-16 ENCOUNTER — APPOINTMENT (OUTPATIENT)
Dept: ANTEPARTUM | Facility: CLINIC | Age: 36
End: 2021-03-16
Payer: COMMERCIAL

## 2021-03-16 PROCEDURE — 76816 OB US FOLLOW-UP PER FETUS: CPT

## 2021-03-16 PROCEDURE — 76819 FETAL BIOPHYS PROFIL W/O NST: CPT

## 2021-03-16 PROCEDURE — 99072 ADDL SUPL MATRL&STAF TM PHE: CPT

## 2021-03-26 ENCOUNTER — OUTPATIENT (OUTPATIENT)
Dept: INPATIENT UNIT | Facility: HOSPITAL | Age: 36
LOS: 1 days | Discharge: HOME | End: 2021-03-26
Payer: COMMERCIAL

## 2021-03-26 VITALS
OXYGEN SATURATION: 100 % | TEMPERATURE: 98 F | HEART RATE: 65 BPM | RESPIRATION RATE: 16 BRPM | SYSTOLIC BLOOD PRESSURE: 107 MMHG | DIASTOLIC BLOOD PRESSURE: 55 MMHG

## 2021-03-26 DIAGNOSIS — Z3A.00 WEEKS OF GESTATION OF PREGNANCY NOT SPECIFIED: ICD-10-CM

## 2021-03-26 DIAGNOSIS — Z98.890 OTHER SPECIFIED POSTPROCEDURAL STATES: Chronic | ICD-10-CM

## 2021-03-26 DIAGNOSIS — O26.899 OTHER SPECIFIED PREGNANCY RELATED CONDITIONS, UNSPECIFIED TRIMESTER: ICD-10-CM

## 2021-03-26 LAB
ALBUMIN SERPL ELPH-MCNC: 3.7 G/DL — SIGNIFICANT CHANGE UP (ref 3.3–5)
ALP SERPL-CCNC: 78 U/L — SIGNIFICANT CHANGE UP (ref 40–120)
ALT FLD-CCNC: 11 U/L — SIGNIFICANT CHANGE UP (ref 4–33)
ANION GAP SERPL CALC-SCNC: 11 MMOL/L — SIGNIFICANT CHANGE UP (ref 7–14)
APPEARANCE UR: CLEAR — SIGNIFICANT CHANGE UP
APTT BLD: 28.5 SEC — SIGNIFICANT CHANGE UP (ref 27–36.3)
AST SERPL-CCNC: 14 U/L — SIGNIFICANT CHANGE UP (ref 4–32)
BACTERIA # UR AUTO: ABNORMAL
BASOPHILS # BLD AUTO: 0 K/UL — SIGNIFICANT CHANGE UP (ref 0–0.2)
BASOPHILS NFR BLD AUTO: 0 % — SIGNIFICANT CHANGE UP (ref 0–2)
BILIRUB SERPL-MCNC: <0.2 MG/DL — SIGNIFICANT CHANGE UP (ref 0.2–1.2)
BILIRUB UR-MCNC: NEGATIVE — SIGNIFICANT CHANGE UP
BUN SERPL-MCNC: 7 MG/DL — SIGNIFICANT CHANGE UP (ref 7–23)
CALCIUM SERPL-MCNC: 9.1 MG/DL — SIGNIFICANT CHANGE UP (ref 8.4–10.5)
CHLORIDE SERPL-SCNC: 103 MMOL/L — SIGNIFICANT CHANGE UP (ref 98–107)
CO2 SERPL-SCNC: 22 MMOL/L — SIGNIFICANT CHANGE UP (ref 22–31)
COLOR SPEC: SIGNIFICANT CHANGE UP
CREAT ?TM UR-MCNC: 66 MG/DL — SIGNIFICANT CHANGE UP
CREAT SERPL-MCNC: 0.45 MG/DL — LOW (ref 0.5–1.3)
DIFF PNL FLD: ABNORMAL
EOSINOPHIL # BLD AUTO: 0 K/UL — SIGNIFICANT CHANGE UP (ref 0–0.5)
EOSINOPHIL NFR BLD AUTO: 0 % — SIGNIFICANT CHANGE UP (ref 0–6)
EPI CELLS # UR: 7 /HPF — HIGH (ref 0–5)
FIBRINOGEN PPP-MCNC: 728 MG/DL — HIGH (ref 290–520)
GIANT PLATELETS BLD QL SMEAR: PRESENT — SIGNIFICANT CHANGE UP
GLUCOSE SERPL-MCNC: 97 MG/DL — SIGNIFICANT CHANGE UP (ref 70–99)
GLUCOSE UR QL: NEGATIVE — SIGNIFICANT CHANGE UP
HCT VFR BLD CALC: 38.3 % — SIGNIFICANT CHANGE UP (ref 34.5–45)
HGB BLD-MCNC: 12.7 G/DL — SIGNIFICANT CHANGE UP (ref 11.5–15.5)
HYALINE CASTS # UR AUTO: 4 /LPF — SIGNIFICANT CHANGE UP (ref 0–7)
IANC: 7.97 K/UL — SIGNIFICANT CHANGE UP (ref 1.5–8.5)
INR BLD: 1.15 RATIO — SIGNIFICANT CHANGE UP (ref 0.88–1.16)
KETONES UR-MCNC: NEGATIVE — SIGNIFICANT CHANGE UP
LDH SERPL L TO P-CCNC: 185 U/L — SIGNIFICANT CHANGE UP (ref 135–225)
LEUKOCYTE ESTERASE UR-ACNC: NEGATIVE — SIGNIFICANT CHANGE UP
LYMPHOCYTES # BLD AUTO: 0.74 K/UL — LOW (ref 1–3.3)
LYMPHOCYTES # BLD AUTO: 7 % — LOW (ref 13–44)
MANUAL SMEAR VERIFICATION: SIGNIFICANT CHANGE UP
MCHC RBC-ENTMCNC: 30 PG — SIGNIFICANT CHANGE UP (ref 27–34)
MCHC RBC-ENTMCNC: 33.2 GM/DL — SIGNIFICANT CHANGE UP (ref 32–36)
MCV RBC AUTO: 90.3 FL — SIGNIFICANT CHANGE UP (ref 80–100)
MONOCYTES # BLD AUTO: 0.55 K/UL — SIGNIFICANT CHANGE UP (ref 0–0.9)
MONOCYTES NFR BLD AUTO: 5.2 % — SIGNIFICANT CHANGE UP (ref 2–14)
NEUTROPHILS # BLD AUTO: 9.11 K/UL — HIGH (ref 1.8–7.4)
NEUTROPHILS NFR BLD AUTO: 84.4 % — HIGH (ref 43–77)
NEUTS BAND # BLD: 1.7 % — SIGNIFICANT CHANGE UP (ref 0–6)
NITRITE UR-MCNC: NEGATIVE — SIGNIFICANT CHANGE UP
PH UR: 6 — SIGNIFICANT CHANGE UP (ref 5–8)
PLAT MORPH BLD: NORMAL — SIGNIFICANT CHANGE UP
PLATELET # BLD AUTO: 342 K/UL — SIGNIFICANT CHANGE UP (ref 150–400)
PLATELET COUNT - ESTIMATE: NORMAL — SIGNIFICANT CHANGE UP
POTASSIUM SERPL-MCNC: 3.6 MMOL/L — SIGNIFICANT CHANGE UP (ref 3.5–5.3)
POTASSIUM SERPL-SCNC: 3.6 MMOL/L — SIGNIFICANT CHANGE UP (ref 3.5–5.3)
PROT ?TM UR-MCNC: 11 MG/DL — SIGNIFICANT CHANGE UP
PROT ?TM UR-MCNC: 11 MG/DL — SIGNIFICANT CHANGE UP
PROT SERPL-MCNC: 6.6 G/DL — SIGNIFICANT CHANGE UP (ref 6–8.3)
PROT UR-MCNC: NEGATIVE — SIGNIFICANT CHANGE UP
PROT/CREAT UR-RTO: 0.2 RATIO — SIGNIFICANT CHANGE UP (ref 0–0.2)
PROTHROM AB SERPL-ACNC: 13 SEC — SIGNIFICANT CHANGE UP (ref 10.6–13.6)
RBC # BLD: 4.24 M/UL — SIGNIFICANT CHANGE UP (ref 3.8–5.2)
RBC # FLD: 13 % — SIGNIFICANT CHANGE UP (ref 10.3–14.5)
RBC BLD AUTO: NORMAL — SIGNIFICANT CHANGE UP
RBC CASTS # UR COMP ASSIST: 3 /HPF — SIGNIFICANT CHANGE UP (ref 0–4)
SMUDGE CELLS # BLD: PRESENT — SIGNIFICANT CHANGE UP
SODIUM SERPL-SCNC: 136 MMOL/L — SIGNIFICANT CHANGE UP (ref 135–145)
SP GR SPEC: 1.01 — SIGNIFICANT CHANGE UP (ref 1.01–1.02)
URATE SERPL-MCNC: 3.7 MG/DL — SIGNIFICANT CHANGE UP (ref 2.5–7)
UROBILINOGEN FLD QL: SIGNIFICANT CHANGE UP
VARIANT LYMPHS # BLD: 1.7 % — SIGNIFICANT CHANGE UP (ref 0–6)
WBC # BLD: 10.58 K/UL — HIGH (ref 3.8–10.5)
WBC # FLD AUTO: 10.58 K/UL — HIGH (ref 3.8–10.5)
WBC UR QL: 12 /HPF — HIGH (ref 0–5)

## 2021-03-26 PROCEDURE — 99203 OFFICE O/P NEW LOW 30 MIN: CPT

## 2021-03-26 RX ORDER — ACETAMINOPHEN 500 MG
975 TABLET ORAL ONCE
Refills: 0 | Status: COMPLETED | OUTPATIENT
Start: 2021-03-26 | End: 2021-03-26

## 2021-03-26 RX ORDER — METOCLOPRAMIDE HCL 10 MG
10 TABLET ORAL ONCE
Refills: 0 | Status: COMPLETED | OUTPATIENT
Start: 2021-03-26 | End: 2021-03-26

## 2021-03-26 RX ORDER — DIPHENHYDRAMINE HCL 50 MG
25 CAPSULE ORAL ONCE
Refills: 0 | Status: COMPLETED | OUTPATIENT
Start: 2021-03-26 | End: 2021-03-26

## 2021-03-26 RX ADMIN — Medication 25 MILLIGRAM(S): at 15:07

## 2021-03-26 RX ADMIN — Medication 975 MILLIGRAM(S): at 14:55

## 2021-03-26 RX ADMIN — Medication 10 MILLIGRAM(S): at 15:17

## 2021-03-26 NOTE — OB PROVIDER TRIAGE NOTE - NSHPLABSRESULTS_GEN_ALL_CORE
12.7   10.58 )-----------( 342      ( 26 Mar 2021 13:21 )             38.3     03-26    136  |  103  |  7   ----------------------------<  97  3.6   |  22  |  0.45<L>    Ca    9.1      26 Mar 2021 13:21    TPro  6.6  /  Alb  3.7  /  TBili  <0.2  /  DBili  x   /  AST  14  /  ALT  11  /  AlkPhos  78  03-26      Fibrinogen 728 12.7   10.58 )-----------( 342      ( 26 Mar 2021 13:21 )             38.3     03-26    136  |  103  |  7   ----------------------------<  97  3.6   |  22  |  0.45<L>    Ca    9.1      26 Mar 2021 13:21    TPro  6.6  /  Alb  3.7  /  TBili  <0.2  /  DBili  x   /  AST  14  /  ALT  11  /  AlkPhos  78  03-26      Fibrinogen 728    PCR.2

## 2021-03-26 NOTE — OB PROVIDER TRIAGE NOTE - YOU WERE IN THE HOSPITAL FOR:
No evidence of hypertensive crisis. No evidence of maternal/fetal distress  - patient cleared for discharge to home, discussed with   - patient to complete 24 hour urine collection  - patient to notify OBGYN for visual disturbances, headaches, nausea/vomiting, right upper quadrant pain, decreased/no fetal movement, loss of fluid, vaginal bleeding, cramping and/or contractions  - patient verbalized understanding.

## 2021-03-26 NOTE — OB PROVIDER TRIAGE NOTE - NSOBPROVIDERNOTE_OBGYN_ALL_OB_FT
Quick Look  1205:  /73 HR 81,  Quick Look  1205:  /73 HR 81,   1244:  /62, HR 83 Quick Look  1205:  /73 HR 81,   1244:  /62, HR 83  1304: HELLP labs sent Quick Look  1205:  /73 HR 81,   1244:  /62, HR 83  1304: HELLP labs sent  1400:  /55 HR 73 Quick Look  1205:  /73 HR 81,   1244:  /62, HR 83  1304: HELLP labs sent  1400:  /55 HR 73    [id number] Tylenol, Benadryl and Reglan administered Quick Look  1205:  /73 HR 81,   1244:  /62, HR 83  1304: HELLP labs sent  1400:  /55 HR 73    [id number] Tylenol, Benadryl and Reglan administered    1614 Patient reports of symptoms improving.     No evidence of hypertensive crisis. No evidence of maternal/fetal distress  - patient cleared for discharge to home, discussed with   - patient to complete 24 hour urine collection  - patient to notify OBGYN for visual disturbances, headaches, nausea/vomiting, right upper quadrant pain, decreased/no fetal movement, loss of fluid, vaginal bleeding, cramping and/or contractions  - patient verbalized understanding.

## 2021-03-26 NOTE — OB PROVIDER TRIAGE NOTE - NSHPPHYSICALEXAM_GEN_ALL_CORE
Vital Signs Last 24 Hrs  HR: 62 (26 Mar 2021 15:16) (62 - 72)  BP: 126/70 (26 Mar 2021 15:16) (107/55 - 127/70)  TAS:  FHR: 135 baseline with accelerations, moderate variability, no decelerations  CTX: none HR: 70 (26 Mar 2021 16:01) (62 - 72)  BP: 106/56 (26 Mar 2021 16:01) (106/56 - 127/70)  FHR: 135 baseline with accelerations, moderate variability, no decelerations  CTX: none

## 2021-03-26 NOTE — OB PROVIDER TRIAGE NOTE - HISTORY OF PRESENT ILLNESS
's patient is a 36 y/o EDC 2021 EGA 29   reporting of blurry vision, headache and dizziness. Patient reports her blood pressure today 141/80s Patient reports of last taking Tylenol 650 mg.   Patient reports of fetal movement. Denies loss of fluid, vaginal bleeding, cramping, contractions.     Medical History: Denies  Surgical History: C/S  OBGYN History: 3/6/2019 FT C/S for arrest of dilation, chorio , 7-0 's patient is a 36 y/o EDC 2021 EGA 29   reporting of blurry vision, headache and dizziness. Patient reports her blood pressure today 141/80s Patient reports of last taking Tylenol 650 mg.   Patient reports of fetal movement. Denies loss of fluid, vaginal bleeding, cramping, contractions.     AP complications: Low pap-a  Medical History: Denies  Surgical History: C/S  OBGYN History: 3/6/2019 FT C/S for arrest of dilation, chorio , 7-0

## 2021-04-20 ENCOUNTER — APPOINTMENT (OUTPATIENT)
Dept: ANTEPARTUM | Facility: CLINIC | Age: 36
End: 2021-04-20
Payer: COMMERCIAL

## 2021-04-20 ENCOUNTER — ASOB RESULT (OUTPATIENT)
Age: 36
End: 2021-04-20

## 2021-04-20 PROCEDURE — 76816 OB US FOLLOW-UP PER FETUS: CPT

## 2021-04-20 PROCEDURE — 99072 ADDL SUPL MATRL&STAF TM PHE: CPT

## 2021-04-20 PROCEDURE — 76819 FETAL BIOPHYS PROFIL W/O NST: CPT

## 2021-05-21 ENCOUNTER — APPOINTMENT (OUTPATIENT)
Dept: ANTEPARTUM | Facility: CLINIC | Age: 36
End: 2021-05-21

## 2021-05-25 ENCOUNTER — OUTPATIENT (OUTPATIENT)
Dept: OUTPATIENT SERVICES | Facility: HOSPITAL | Age: 36
LOS: 1 days | End: 2021-05-25

## 2021-05-25 VITALS
SYSTOLIC BLOOD PRESSURE: 130 MMHG | HEART RATE: 93 BPM | DIASTOLIC BLOOD PRESSURE: 80 MMHG | RESPIRATION RATE: 18 BRPM | OXYGEN SATURATION: 98 % | WEIGHT: 244.93 LBS | HEIGHT: 65.5 IN | TEMPERATURE: 98 F

## 2021-05-25 DIAGNOSIS — Z98.890 OTHER SPECIFIED POSTPROCEDURAL STATES: Chronic | ICD-10-CM

## 2021-05-25 DIAGNOSIS — Z64.1 PROBLEMS RELATED TO MULTIPARITY: ICD-10-CM

## 2021-05-25 DIAGNOSIS — O34.29 MATERNAL CARE DUE TO UTERINE SCAR FROM OTHER PREVIOUS SURGERY: ICD-10-CM

## 2021-05-25 LAB
APPEARANCE UR: ABNORMAL
BACTERIA # UR AUTO: ABNORMAL
BASOPHILS # BLD AUTO: 0.02 K/UL — SIGNIFICANT CHANGE UP (ref 0–0.2)
BASOPHILS NFR BLD AUTO: 0.2 % — SIGNIFICANT CHANGE UP (ref 0–2)
BILIRUB UR-MCNC: NEGATIVE — SIGNIFICANT CHANGE UP
BLD GP AB SCN SERPL QL: NEGATIVE — SIGNIFICANT CHANGE UP
COLOR SPEC: SIGNIFICANT CHANGE UP
DIFF PNL FLD: NEGATIVE — SIGNIFICANT CHANGE UP
EOSINOPHIL # BLD AUTO: 0.04 K/UL — SIGNIFICANT CHANGE UP (ref 0–0.5)
EOSINOPHIL NFR BLD AUTO: 0.4 % — SIGNIFICANT CHANGE UP (ref 0–6)
EPI CELLS # UR: ABNORMAL
GLUCOSE UR QL: NEGATIVE — SIGNIFICANT CHANGE UP
HCT VFR BLD CALC: 39.1 % — SIGNIFICANT CHANGE UP (ref 34.5–45)
HGB BLD-MCNC: 12.4 G/DL — SIGNIFICANT CHANGE UP (ref 11.5–15.5)
HYALINE CASTS # UR AUTO: 0 /LPF — SIGNIFICANT CHANGE UP (ref 0–7)
IANC: 7.91 K/UL — SIGNIFICANT CHANGE UP (ref 1.5–8.5)
IMM GRANULOCYTES NFR BLD AUTO: 0.7 % — SIGNIFICANT CHANGE UP (ref 0–1.5)
KETONES UR-MCNC: NEGATIVE — SIGNIFICANT CHANGE UP
LEUKOCYTE ESTERASE UR-ACNC: NEGATIVE — SIGNIFICANT CHANGE UP
LYMPHOCYTES # BLD AUTO: 1.49 K/UL — SIGNIFICANT CHANGE UP (ref 1–3.3)
LYMPHOCYTES # BLD AUTO: 14.6 % — SIGNIFICANT CHANGE UP (ref 13–44)
MCHC RBC-ENTMCNC: 28.6 PG — SIGNIFICANT CHANGE UP (ref 27–34)
MCHC RBC-ENTMCNC: 31.7 GM/DL — LOW (ref 32–36)
MCV RBC AUTO: 90.1 FL — SIGNIFICANT CHANGE UP (ref 80–100)
MONOCYTES # BLD AUTO: 0.65 K/UL — SIGNIFICANT CHANGE UP (ref 0–0.9)
MONOCYTES NFR BLD AUTO: 6.4 % — SIGNIFICANT CHANGE UP (ref 2–14)
NEUTROPHILS # BLD AUTO: 7.91 K/UL — HIGH (ref 1.8–7.4)
NEUTROPHILS NFR BLD AUTO: 77.7 % — HIGH (ref 43–77)
NITRITE UR-MCNC: NEGATIVE — SIGNIFICANT CHANGE UP
NRBC # BLD: 0 /100 WBCS — SIGNIFICANT CHANGE UP
NRBC # FLD: 0 K/UL — SIGNIFICANT CHANGE UP
PH UR: 6 — SIGNIFICANT CHANGE UP (ref 5–8)
PLATELET # BLD AUTO: 314 K/UL — SIGNIFICANT CHANGE UP (ref 150–400)
PROT UR-MCNC: ABNORMAL
RBC # BLD: 4.34 M/UL — SIGNIFICANT CHANGE UP (ref 3.8–5.2)
RBC # FLD: 13 % — SIGNIFICANT CHANGE UP (ref 10.3–14.5)
RBC CASTS # UR COMP ASSIST: 3 /HPF — SIGNIFICANT CHANGE UP (ref 0–4)
RH IG SCN BLD-IMP: POSITIVE — SIGNIFICANT CHANGE UP
SP GR SPEC: 1.01 — SIGNIFICANT CHANGE UP (ref 1.01–1.02)
UROBILINOGEN FLD QL: SIGNIFICANT CHANGE UP
WBC # BLD: 10.18 K/UL — SIGNIFICANT CHANGE UP (ref 3.8–10.5)
WBC # FLD AUTO: 10.18 K/UL — SIGNIFICANT CHANGE UP (ref 3.8–10.5)
WBC UR QL: 3 /HPF — SIGNIFICANT CHANGE UP (ref 0–5)

## 2021-05-25 NOTE — OB PST NOTE - PROBLEM SELECTOR PLAN 1
Scheduled for repeat  section, b/l tubal ligation on 21  Written & verbal preop instructions, gi prophylaxis & surgical soap given  Pt verbalized good understanding.  Teach back done on surgical soap instructions.  per pt scheduled within 72 hrs of this surgery

## 2021-05-25 NOTE — OB PST NOTE - NSHPPHYSICALEXAM_GEN_ALL_CORE
Constitutional: Well Developed, Well Groomed, Well Nourished, No Distress    Eyes: PERRL, EOMI, conjunctiva clear    Ears: Normal    Mouth & Gums: Normal, moist    Pharynx: No tenderness, discharge, or peritonsillar abscess    Tonsils: No Redness, discharge, tenderness, or swelling    Neck: Supple, no JVD, normal thyroid glands, no carotid bruits, no cervical vertebral or paraspinal tenderness    Breast: Normal shape, no masses, no tenderness, nipples normal, no nipple discharge    Back: Normal shape, ROM intact, strength intact, no vertebral tenderness    Respiratory: Airway patent, breath sounds equal, good air movement, respiration non-labored, clear to auscultation bilateral, no chest wall tenderness, no intercostal retractions, no rales, no wheezes, no rhonchi, no subcutaneous emphysema    Cardiovascular:  Regular rate and rhythm, no rubs or murmur, normal PMI    Gastrointestinal: Soft, non-tender, non distention, no masses palpable, bowel sound normal, no bruit, no rebound tenderness    Extremities: No clubbing, cyanosis, or pedal edema    Vascular:  Carotid Pulse normal , Radial Pulse normal, Femoral Pulse normal, DP pulse normal, PT pulse normal    Neurological: alert & oriented x 3, sensation intact, deep reflexes intact, cranial nerve intact, normal strength    Skin: warm and dry, normal color    Lymph Nodes: normal posterior cervical lymph node, normal anterior cervical lymph node, normal supraclavicular lymph node, normal axillary lymph node, normal inguinal lymph node, normal femoral lymph node    Musculoskeletal: ROM intact, no joint swelling, warmth, or calf tenderness. Normal strength    Psychiatric: normal affect, normal behavior Constitutional: Well Developed, Well Groomed, Well Nourished, No Distress    Eyes: PERRL, EOMI, conjunctiva clear    Ears: Normal    Mouth & Gums: Normal, moist    Pharynx: No tenderness, discharge, or peritonsillar abscess    Tonsils: No Redness, discharge, tenderness, or swelling    Neck: Supple, no JVD, normal thyroid glands, no carotid bruits, no cervical vertebral or paraspinal tenderness    Breast: Normal shape, no masses, no tenderness, nipples normal, no nipple discharge    Back: Normal shape, ROM intact, strength intact, no vertebral tenderness    Respiratory: Airway patent, breath sounds equal, good air movement, respiration non-labored, clear to auscultation bilateral, no chest wall tenderness, no intercostal retractions, no rales, no wheezes, no rhonchi, no subcutaneous emphysema    Cardiovascular:  Regular rate and rhythm, no rubs or murmur, normal PMI    Gastrointestinal:  pregnant abdomen, non-tender, bowel sound normal, no bruit, no rebound tenderness    Extremities:  mild swelling b/l ankles No clubbing, cyanosis,     Vascular:  Carotid Pulse normal , Radial Pulse normal, Femoral Pulse normal, DP pulse normal, PT pulse normal    Neurological: alert & oriented x 3, sensation intact, deep reflexes intact, cranial nerve intact, normal strength    Skin: warm and dry, normal color    Lymph Nodes: normal posterior cervical lymph node, normal anterior cervical lymph node, normal supraclavicular lymph node, normal axillary lymph node, normal inguinal lymph node, normal femoral lymph node    Musculoskeletal: ROM intact, no joint swelling, warmth, or calf tenderness. Normal strength    Psychiatric: normal affect, normal behavior

## 2021-05-25 NOTE — OB PST NOTE - PSH
delivery delivered  3/6/2019 asim girl 7lbs, due to fetal distress & poor labor progression  H/O colonoscopy

## 2021-05-25 NOTE — OB PST NOTE - HISTORY OF PRESENT ILLNESS
34 y/o female presents for preop eval for repeat , b/l tubal ligation.   Pt denies vaginal bleeding/spotting/leakage and verbalized positive fetal movement felt "now".

## 2021-05-29 ENCOUNTER — APPOINTMENT (OUTPATIENT)
Dept: DISASTER EMERGENCY | Facility: CLINIC | Age: 36
End: 2021-05-29

## 2021-05-29 DIAGNOSIS — Z01.818 ENCOUNTER FOR OTHER PREPROCEDURAL EXAMINATION: ICD-10-CM

## 2021-05-29 LAB — SARS-COV-2 N GENE NPH QL NAA+PROBE: NOT DETECTED

## 2021-05-31 ENCOUNTER — TRANSCRIPTION ENCOUNTER (OUTPATIENT)
Age: 36
End: 2021-05-31

## 2021-06-01 ENCOUNTER — RESULT REVIEW (OUTPATIENT)
Age: 36
End: 2021-06-01

## 2021-06-01 ENCOUNTER — INPATIENT (INPATIENT)
Facility: HOSPITAL | Age: 36
LOS: 1 days | Discharge: HOME | End: 2021-06-03
Attending: OBSTETRICS & GYNECOLOGY | Admitting: OBSTETRICS & GYNECOLOGY
Payer: COMMERCIAL

## 2021-06-01 ENCOUNTER — TRANSCRIPTION ENCOUNTER (OUTPATIENT)
Age: 36
End: 2021-06-01

## 2021-06-01 VITALS
HEART RATE: 79 BPM | SYSTOLIC BLOOD PRESSURE: 136 MMHG | TEMPERATURE: 98 F | DIASTOLIC BLOOD PRESSURE: 82 MMHG | RESPIRATION RATE: 14 BRPM

## 2021-06-01 DIAGNOSIS — O34.29 MATERNAL CARE DUE TO UTERINE SCAR FROM OTHER PREVIOUS SURGERY: ICD-10-CM

## 2021-06-01 DIAGNOSIS — Z98.890 OTHER SPECIFIED POSTPROCEDURAL STATES: Chronic | ICD-10-CM

## 2021-06-01 DIAGNOSIS — E66.9 OBESITY, UNSPECIFIED: ICD-10-CM

## 2021-06-01 LAB
BASOPHILS # BLD AUTO: 0.03 K/UL — SIGNIFICANT CHANGE UP (ref 0–0.2)
BASOPHILS NFR BLD AUTO: 0.3 % — SIGNIFICANT CHANGE UP (ref 0–2)
COVID-19 SPIKE DOMAIN AB INTERP: POSITIVE
COVID-19 SPIKE DOMAIN ANTIBODY RESULT: >250 U/ML — HIGH
EOSINOPHIL # BLD AUTO: 0.06 K/UL — SIGNIFICANT CHANGE UP (ref 0–0.5)
EOSINOPHIL NFR BLD AUTO: 0.6 % — SIGNIFICANT CHANGE UP (ref 0–6)
HCT VFR BLD CALC: 39.5 % — SIGNIFICANT CHANGE UP (ref 34.5–45)
HGB BLD-MCNC: 12.8 G/DL — SIGNIFICANT CHANGE UP (ref 11.5–15.5)
IANC: 8.29 K/UL — SIGNIFICANT CHANGE UP (ref 1.5–8.5)
IMM GRANULOCYTES NFR BLD AUTO: 0.8 % — SIGNIFICANT CHANGE UP (ref 0–1.5)
LYMPHOCYTES # BLD AUTO: 1.44 K/UL — SIGNIFICANT CHANGE UP (ref 1–3.3)
LYMPHOCYTES # BLD AUTO: 13.5 % — SIGNIFICANT CHANGE UP (ref 13–44)
MCHC RBC-ENTMCNC: 29.3 PG — SIGNIFICANT CHANGE UP (ref 27–34)
MCHC RBC-ENTMCNC: 32.4 GM/DL — SIGNIFICANT CHANGE UP (ref 32–36)
MCV RBC AUTO: 90.4 FL — SIGNIFICANT CHANGE UP (ref 80–100)
MONOCYTES # BLD AUTO: 0.73 K/UL — SIGNIFICANT CHANGE UP (ref 0–0.9)
MONOCYTES NFR BLD AUTO: 6.9 % — SIGNIFICANT CHANGE UP (ref 2–14)
NEUTROPHILS # BLD AUTO: 8.29 K/UL — HIGH (ref 1.8–7.4)
NEUTROPHILS NFR BLD AUTO: 77.9 % — HIGH (ref 43–77)
NRBC # BLD: 0 /100 WBCS — SIGNIFICANT CHANGE UP
NRBC # FLD: 0 K/UL — SIGNIFICANT CHANGE UP
PLATELET # BLD AUTO: 342 K/UL — SIGNIFICANT CHANGE UP (ref 150–400)
RBC # BLD: 4.37 M/UL — SIGNIFICANT CHANGE UP (ref 3.8–5.2)
RBC # FLD: 13.1 % — SIGNIFICANT CHANGE UP (ref 10.3–14.5)
RH IG SCN BLD-IMP: POSITIVE — SIGNIFICANT CHANGE UP
SARS-COV-2 IGG+IGM SERPL QL IA: >250 U/ML — HIGH
SARS-COV-2 IGG+IGM SERPL QL IA: POSITIVE
T PALLIDUM AB TITR SER: NEGATIVE — SIGNIFICANT CHANGE UP
WBC # BLD: 10.64 K/UL — HIGH (ref 3.8–10.5)
WBC # FLD AUTO: 10.64 K/UL — HIGH (ref 3.8–10.5)

## 2021-06-01 PROCEDURE — 76815 OB US LIMITED FETUS(S): CPT | Mod: 26,AS,U9

## 2021-06-01 PROCEDURE — 88302 TISSUE EXAM BY PATHOLOGIST: CPT | Mod: 26

## 2021-06-01 PROCEDURE — 59025 FETAL NON-STRESS TEST: CPT | Mod: 26,U9

## 2021-06-01 RX ORDER — BUTORPHANOL TARTRATE 2 MG/ML
0.25 INJECTION, SOLUTION INTRAMUSCULAR; INTRAVENOUS EVERY 6 HOURS
Refills: 0 | Status: DISCONTINUED | OUTPATIENT
Start: 2021-06-01 | End: 2021-06-01

## 2021-06-01 RX ORDER — SODIUM CHLORIDE 9 MG/ML
1000 INJECTION, SOLUTION INTRAVENOUS
Refills: 0 | Status: DISCONTINUED | OUTPATIENT
Start: 2021-06-01 | End: 2021-06-02

## 2021-06-01 RX ORDER — OXYCODONE HYDROCHLORIDE 5 MG/1
5 TABLET ORAL
Refills: 0 | Status: DISCONTINUED | OUTPATIENT
Start: 2021-06-01 | End: 2021-06-02

## 2021-06-01 RX ORDER — OXYTOCIN 10 UNIT/ML
333.33 VIAL (ML) INJECTION
Qty: 20 | Refills: 0 | Status: DISCONTINUED | OUTPATIENT
Start: 2021-06-01 | End: 2021-06-02

## 2021-06-01 RX ORDER — ACETAMINOPHEN 500 MG
2 TABLET ORAL
Qty: 0 | Refills: 0 | DISCHARGE

## 2021-06-01 RX ORDER — CITRIC ACID/SODIUM CITRATE 300-500 MG
30 SOLUTION, ORAL ORAL ONCE
Refills: 0 | Status: COMPLETED | OUTPATIENT
Start: 2021-06-01 | End: 2021-06-01

## 2021-06-01 RX ORDER — KETOROLAC TROMETHAMINE 30 MG/ML
30 SYRINGE (ML) INJECTION EVERY 6 HOURS
Refills: 0 | Status: DISCONTINUED | OUTPATIENT
Start: 2021-06-01 | End: 2021-06-02

## 2021-06-01 RX ORDER — FAMOTIDINE 10 MG/ML
20 INJECTION INTRAVENOUS ONCE
Refills: 0 | Status: COMPLETED | OUTPATIENT
Start: 2021-06-01 | End: 2021-06-01

## 2021-06-01 RX ORDER — TETANUS TOXOID, REDUCED DIPHTHERIA TOXOID AND ACELLULAR PERTUSSIS VACCINE, ADSORBED 5; 2.5; 8; 8; 2.5 [IU]/.5ML; [IU]/.5ML; UG/.5ML; UG/.5ML; UG/.5ML
0.5 SUSPENSION INTRAMUSCULAR ONCE
Refills: 0 | Status: DISCONTINUED | OUTPATIENT
Start: 2021-06-01 | End: 2021-06-03

## 2021-06-01 RX ORDER — IBUPROFEN 200 MG
3 TABLET ORAL
Qty: 0 | Refills: 0 | DISCHARGE

## 2021-06-01 RX ORDER — DIPHENHYDRAMINE HCL 50 MG
25 CAPSULE ORAL EVERY 6 HOURS
Refills: 0 | Status: DISCONTINUED | OUTPATIENT
Start: 2021-06-01 | End: 2021-06-03

## 2021-06-01 RX ORDER — ACETAMINOPHEN 500 MG
1000 TABLET ORAL ONCE
Refills: 0 | Status: COMPLETED | OUTPATIENT
Start: 2021-06-01 | End: 2021-06-01

## 2021-06-01 RX ORDER — NALOXONE HYDROCHLORIDE 4 MG/.1ML
0.1 SPRAY NASAL
Refills: 0 | Status: DISCONTINUED | OUTPATIENT
Start: 2021-06-01 | End: 2021-06-03

## 2021-06-01 RX ORDER — MAGNESIUM HYDROXIDE 400 MG/1
30 TABLET, CHEWABLE ORAL
Refills: 0 | Status: DISCONTINUED | OUTPATIENT
Start: 2021-06-01 | End: 2021-06-03

## 2021-06-01 RX ORDER — SODIUM CHLORIDE 9 MG/ML
1000 INJECTION, SOLUTION INTRAVENOUS ONCE
Refills: 0 | Status: COMPLETED | OUTPATIENT
Start: 2021-06-01 | End: 2021-06-01

## 2021-06-01 RX ORDER — HEPARIN SODIUM 5000 [USP'U]/ML
10000 INJECTION INTRAVENOUS; SUBCUTANEOUS EVERY 12 HOURS
Refills: 0 | Status: DISCONTINUED | OUTPATIENT
Start: 2021-06-01 | End: 2021-06-03

## 2021-06-01 RX ORDER — ONDANSETRON 8 MG/1
4 TABLET, FILM COATED ORAL EVERY 6 HOURS
Refills: 0 | Status: DISCONTINUED | OUTPATIENT
Start: 2021-06-01 | End: 2021-06-03

## 2021-06-01 RX ORDER — OXYCODONE HYDROCHLORIDE 5 MG/1
10 TABLET ORAL
Refills: 0 | Status: DISCONTINUED | OUTPATIENT
Start: 2021-06-01 | End: 2021-06-01

## 2021-06-01 RX ORDER — ACETAMINOPHEN 500 MG
975 TABLET ORAL
Refills: 0 | Status: DISCONTINUED | OUTPATIENT
Start: 2021-06-01 | End: 2021-06-03

## 2021-06-01 RX ORDER — LANOLIN
1 OINTMENT (GRAM) TOPICAL EVERY 6 HOURS
Refills: 0 | Status: DISCONTINUED | OUTPATIENT
Start: 2021-06-01 | End: 2021-06-03

## 2021-06-01 RX ORDER — OXYCODONE HYDROCHLORIDE 5 MG/1
5 TABLET ORAL
Refills: 0 | Status: COMPLETED | OUTPATIENT
Start: 2021-06-01 | End: 2021-06-08

## 2021-06-01 RX ORDER — DEXAMETHASONE 0.5 MG/5ML
4 ELIXIR ORAL EVERY 6 HOURS
Refills: 0 | Status: DISCONTINUED | OUTPATIENT
Start: 2021-06-01 | End: 2021-06-03

## 2021-06-01 RX ORDER — IBUPROFEN 200 MG
600 TABLET ORAL EVERY 6 HOURS
Refills: 0 | Status: COMPLETED | OUTPATIENT
Start: 2021-06-01 | End: 2022-04-30

## 2021-06-01 RX ORDER — ONDANSETRON 8 MG/1
4 TABLET, FILM COATED ORAL ONCE
Refills: 0 | Status: DISCONTINUED | OUTPATIENT
Start: 2021-06-01 | End: 2021-06-01

## 2021-06-01 RX ORDER — SIMETHICONE 80 MG/1
80 TABLET, CHEWABLE ORAL EVERY 4 HOURS
Refills: 0 | Status: DISCONTINUED | OUTPATIENT
Start: 2021-06-01 | End: 2021-06-03

## 2021-06-01 RX ORDER — OXYCODONE HYDROCHLORIDE 5 MG/1
5 TABLET ORAL ONCE
Refills: 0 | Status: DISCONTINUED | OUTPATIENT
Start: 2021-06-01 | End: 2021-06-03

## 2021-06-01 RX ADMIN — Medication 30 MILLIGRAM(S): at 23:38

## 2021-06-01 RX ADMIN — Medication 400 MILLIGRAM(S): at 14:22

## 2021-06-01 RX ADMIN — Medication 975 MILLIGRAM(S): at 21:16

## 2021-06-01 RX ADMIN — Medication 975 MILLIGRAM(S): at 22:00

## 2021-06-01 RX ADMIN — HEPARIN SODIUM 10000 UNIT(S): 5000 INJECTION INTRAVENOUS; SUBCUTANEOUS at 17:05

## 2021-06-01 RX ADMIN — SODIUM CHLORIDE 75 MILLILITER(S): 9 INJECTION, SOLUTION INTRAVENOUS at 14:23

## 2021-06-01 RX ADMIN — Medication 30 MILLIGRAM(S): at 18:49

## 2021-06-01 RX ADMIN — SODIUM CHLORIDE 2000 MILLILITER(S): 9 INJECTION, SOLUTION INTRAVENOUS at 09:55

## 2021-06-01 RX ADMIN — SODIUM CHLORIDE 125 MILLILITER(S): 9 INJECTION, SOLUTION INTRAVENOUS at 09:55

## 2021-06-01 RX ADMIN — SODIUM CHLORIDE 2000 MILLILITER(S): 9 INJECTION, SOLUTION INTRAVENOUS at 15:15

## 2021-06-01 RX ADMIN — FAMOTIDINE 20 MILLIGRAM(S): 10 INJECTION INTRAVENOUS at 09:55

## 2021-06-01 RX ADMIN — Medication 30 MILLIGRAM(S): at 19:30

## 2021-06-01 RX ADMIN — Medication 1000 MILLIUNIT(S)/MIN: at 14:23

## 2021-06-01 RX ADMIN — Medication 30 MILLILITER(S): at 09:55

## 2021-06-01 RX ADMIN — OXYCODONE HYDROCHLORIDE 10 MILLIGRAM(S): 5 TABLET ORAL at 14:51

## 2021-06-01 RX ADMIN — OXYCODONE HYDROCHLORIDE 10 MILLIGRAM(S): 5 TABLET ORAL at 15:56

## 2021-06-01 RX ADMIN — Medication 1000 MILLIGRAM(S): at 14:45

## 2021-06-01 NOTE — OB PROVIDER DELIVERY SUMMARY - NSSELHIDDEN_OBGYN_ALL_OB_FT
[NS_DeliveryAttending1_OBGYN_ALL_OB_FT:[id number]==],[NS_DeliveryAssist1_OBGYN_ALL_OB_FT:[id number]],[NS_DeliveryRN_OBGYN_ALL_OB_FT:[id number]]

## 2021-06-01 NOTE — OB RN PATIENT PROFILE - ALERT: PERTINENT HISTORY
Patient desires tubal ligation/1st Trimester Sonogram/20 Week Level II Sonogram/Ultra Screen at 12 Weeks

## 2021-06-01 NOTE — BRIEF OPERATIVE NOTE - NSICDXBRIEFPOSTOP_GEN_ALL_CORE_FT
POST-OP DIAGNOSIS:  S/P  section 2021 12:32:01  Bianca Herrmann  LGA (large for gestational age) infant 2021 12:32:12  Bianca Herrmann  Status post bilateral salpingectomy 2021 12:32:46  Bianca Herrmann

## 2021-06-01 NOTE — OB POSTPARTUM EVENT NOTE - NS_EVENTPTSUMMARY1_OBGYN_ALL_OB_FT
s/p c/s, , IV fluids 3400, urine output 600cc  pitocin 125 cc/hr, LR 75 cc/hr  fundus firm, at umbilicus

## 2021-06-01 NOTE — OB RN PATIENT PROFILE - PATIENT REPRESENTATIVE PHONE
[FreeTextEntry1] : 53 yr old with EoE doing very well on swallowed Flovent 110 2p qd with significant decrease in clinical symptoms along with much improved biopsy with no evidence of Eos\par Would suggest continuing swallowed Flovent for now and continue reduction of milk in diet. We did discuss use of biologics for EoE but at this point there remains no FDA approved drug for use\par \par Will follow up 6 months.\par \par Trell Bruner MD, FAAP, FAAAAI\par [facility]\par [facility]\par [facility]\par [facility] [place]\par [address], [address], [place]  [address]\par [address] [place] [address], [address] [place] [address]  [address]\par [phone]\par  [phone]

## 2021-06-01 NOTE — DISCHARGE NOTE OB - PLAN OF CARE
return to usual activities nothing per vagina x 6 weeks, follow-up for incision check in 2 weeks, full post partum visit in 6 weeks. nothing per vagina x 6 weeks, follow-up for incision check in 2 weeks, full post partum visit in 6 weeks.    Oxycodone px was sent to your pharmacy through the office.

## 2021-06-01 NOTE — BRIEF OPERATIVE NOTE - OPERATION/FINDINGS
Viable male infant, apgar 9/9, wgt 8.14 #, 4020gms  delivered @11:03  LGA baby  cephalic presentation, clear copious fluid  hysterotomy closed in single layer , over sewn w/ additional Caprosyn mid hysterotomy  mucoid secretion expressed from mid hysterotomy  transient atony resolved w/ 40u Pitocin  Nicci over hysterotomy  otherwise grossly nl uterus, tubes & ovaries  b/l salpingectomy performed via LIGASURE    QBL: 467  UOP: 600  IVF: 3400  Dictation Number: [id number]  21 @ 12:27

## 2021-06-01 NOTE — OB RN DELIVERY SUMMARY - NSSELHIDDEN_OBGYN_ALL_OB_FT
[NS_DeliveryAttending1_OBGYN_ALL_OB_FT:[id number]==],[NS_DeliveryAssist1_OBGYN_ALL_OB_FT:[id number]],[NS_DeliveryRN_OBGYN_ALL_OB_FT:[id number]]

## 2021-06-01 NOTE — DISCHARGE NOTE OB - MEDICATION SUMMARY - MEDICATIONS TO TAKE
I will START or STAY ON the medications listed below when I get home from the hospital:    ibuprofen 200 mg oral tablet  -- 3 tab(s) by mouth every 6 hours, As Needed  -- Indication: For pain    Tylenol 500 mg oral tablet  -- 2 tab(s) by mouth every 6 hours, As Needed  -- Indication: For pain   I will START or STAY ON the medications listed below when I get home from the hospital:    ibuprofen 200 mg oral tablet  -- 3 tab(s) by mouth every 6 hours, As Needed  -- Indication: For Pain, as needed    Tylenol 500 mg oral tablet  -- 2 tab(s) by mouth every 6 hours, As Needed  -- Indication: For Pain, as needed

## 2021-06-01 NOTE — OB RN DELIVERY SUMMARY - BABY A: ID BAND NUMBER, DELIVERY
Pt bladder scanner pt has 80 mL max residual urine in bladder at this time.  10 mL in phan collection [id number]

## 2021-06-01 NOTE — OB PROVIDER H&P - ASSESSMENT
35 year old  @ 39/3 presenting for repeat scheduled c/s and BTL     -Admit to L&D   -Continuous Monitoring   -IVF   -Blood transfusion consents signed   -Tubal paperwork to be signed   -Consents to be signed   -Preop Meds ordered     d/w Dr. Vernon Garza, PGY-1

## 2021-06-01 NOTE — OB PROVIDER DELIVERY SUMMARY - NSPROVIDERDELIVERYNOTE_OBGYN_ALL_OB_FT
Uncomplicated RLTCS with bilateral salpingectomy.  Grossly normal uterus tubes, ovaries.  IVF 3400ml  UOP 600ml

## 2021-06-01 NOTE — DISCHARGE NOTE OB - PATIENT PORTAL LINK FT
You can access the FollowMyHealth Patient Portal offered by [facility] by registering at the following website: [email or web address]. By joining [facility]’s FollowMyHealth portal, you will also be able to view your health information using other applications (apps) compatible with our system.

## 2021-06-01 NOTE — OB PROVIDER H&P - HISTORY OF PRESENT ILLNESS
34 yo  @ 39 w 3d  p/f scheduled repeat scheduled  section and Bilateral tubal ligation. –VB, -LOF, -Ctx, +FM. denies fever, chills, nausea, vomiting, diarrhea, headache, constipation, dizziness, syncope, chest pain, palpitations, shortness of breath, dysuria, urgency, frequency.  PNC: low MELQUIADES-A  GBS: neg  EFW: 3600  ObHx: 2019 pLTCS for failed induction (7#8)  GynHx: denies fibroids, cysts, STD/STI, abn pap  MedHx: denies  SrgHx: denies  PsychHx: denies  SocialHx: denies  AllergyHx: denies  RxHx: ASA( last dose 10 days prior)

## 2021-06-01 NOTE — DISCHARGE NOTE OB - CARE PLAN
Goal:	return to usual activities  Assessment and plan of treatment:	nothing per vagina x 6 weeks, follow-up for incision check in 2 weeks, full post partum visit in 6 weeks.   Principal Discharge DX:	 delivery delivered  Goal:	return to usual activities  Assessment and plan of treatment:	nothing per vagina x 6 weeks, follow-up for incision check in 2 weeks, full post partum visit in 6 weeks.   Principal Discharge DX:	 delivery delivered  Goal:	return to usual activities  Assessment and plan of treatment:	nothing per vagina x 6 weeks, follow-up for incision check in 2 weeks, full post partum visit in 6 weeks.    Oxycodone px was sent to your pharmacy through the office.

## 2021-06-01 NOTE — OB PROVIDER H&P - ATTENDING COMMENTS
OB Attending    Agree with above.  Pt admitted for repeat C/S and B/L salpingectomy.  Shortly before surgery, I met with this patient and discussed the planned procedure.  Risks of the procedure were reviewed, including but not limited to bleeding, infection, damage to surrounding organs, possibility for unplanned procedure if complications arise.  All questions were answered.     MD Francesco

## 2021-06-01 NOTE — OB POSTPARTUM EVENT NOTE - NS_EVENTFINDINGS1_OBGYN_ALL_OB_FT
patient to receive 1LR bolus as per PA Alize, patient to drink fluids as per PA, will continue to monitor

## 2021-06-01 NOTE — OB RN PATIENT PROFILE - PMH
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

## 2021-06-01 NOTE — OB RN INTRAOPERATIVE NOTE - NSSELHIDDEN_OBGYN_ALL_OB_FT
[NS_DeliveryAttending1_OBGYN_ALL_OB_FT:[id number]],[NS_DeliveryAssist1_OBGYN_ALL_OB_FT:[id number]],[NS_DeliveryRN_OBGYN_ALL_OB_FT:[id number]]

## 2021-06-01 NOTE — DISCHARGE NOTE OB - HOSPITAL COURSE
Admitted for scheduled  section and B/L salpingectomy.  Uncomplicated repeat LTCS, bilateral salpingectomy on 21, viable male infant.

## 2021-06-01 NOTE — OB PROVIDER DELIVERY SUMMARY - NSPPHRISKEVAL_OBGYN_ALL_OB
Large for gestational age  BMI >40/Large for gestational age /Prior , uterine surgery, or multiple laparotomies

## 2021-06-02 LAB
BASOPHILS # BLD AUTO: 0.04 K/UL — SIGNIFICANT CHANGE UP (ref 0–0.2)
BASOPHILS NFR BLD AUTO: 0.4 % — SIGNIFICANT CHANGE UP (ref 0–2)
EOSINOPHIL # BLD AUTO: 0.07 K/UL — SIGNIFICANT CHANGE UP (ref 0–0.5)
EOSINOPHIL NFR BLD AUTO: 0.7 % — SIGNIFICANT CHANGE UP (ref 0–6)
HCT VFR BLD CALC: 33.2 % — LOW (ref 34.5–45)
HGB BLD-MCNC: 10.7 G/DL — LOW (ref 11.5–15.5)
IANC: 7.41 K/UL — SIGNIFICANT CHANGE UP (ref 1.5–8.5)
IMM GRANULOCYTES NFR BLD AUTO: 0.6 % — SIGNIFICANT CHANGE UP (ref 0–1.5)
LYMPHOCYTES # BLD AUTO: 1.31 K/UL — SIGNIFICANT CHANGE UP (ref 1–3.3)
LYMPHOCYTES # BLD AUTO: 13.4 % — SIGNIFICANT CHANGE UP (ref 13–44)
MCHC RBC-ENTMCNC: 29.2 PG — SIGNIFICANT CHANGE UP (ref 27–34)
MCHC RBC-ENTMCNC: 32.2 GM/DL — SIGNIFICANT CHANGE UP (ref 32–36)
MCV RBC AUTO: 90.5 FL — SIGNIFICANT CHANGE UP (ref 80–100)
MONOCYTES # BLD AUTO: 0.89 K/UL — SIGNIFICANT CHANGE UP (ref 0–0.9)
MONOCYTES NFR BLD AUTO: 9.1 % — SIGNIFICANT CHANGE UP (ref 2–14)
NEUTROPHILS # BLD AUTO: 7.41 K/UL — HIGH (ref 1.8–7.4)
NEUTROPHILS NFR BLD AUTO: 75.8 % — SIGNIFICANT CHANGE UP (ref 43–77)
NRBC # BLD: 0 /100 WBCS — SIGNIFICANT CHANGE UP
NRBC # FLD: 0 K/UL — SIGNIFICANT CHANGE UP
PLATELET # BLD AUTO: 254 K/UL — SIGNIFICANT CHANGE UP (ref 150–400)
RBC # BLD: 3.67 M/UL — LOW (ref 3.8–5.2)
RBC # FLD: 13.1 % — SIGNIFICANT CHANGE UP (ref 10.3–14.5)
WBC # BLD: 9.78 K/UL — SIGNIFICANT CHANGE UP (ref 3.8–10.5)
WBC # FLD AUTO: 9.78 K/UL — SIGNIFICANT CHANGE UP (ref 3.8–10.5)

## 2021-06-02 RX ORDER — IBUPROFEN 200 MG
600 TABLET ORAL EVERY 6 HOURS
Refills: 0 | Status: DISCONTINUED | OUTPATIENT
Start: 2021-06-02 | End: 2021-06-03

## 2021-06-02 RX ORDER — OXYCODONE HYDROCHLORIDE 5 MG/1
5 TABLET ORAL ONCE
Refills: 0 | Status: DISCONTINUED | OUTPATIENT
Start: 2021-06-02 | End: 2021-06-02

## 2021-06-02 RX ADMIN — MAGNESIUM HYDROXIDE 30 MILLILITER(S): 400 TABLET, CHEWABLE ORAL at 05:58

## 2021-06-02 RX ADMIN — Medication 600 MILLIGRAM(S): at 18:44

## 2021-06-02 RX ADMIN — Medication 975 MILLIGRAM(S): at 15:30

## 2021-06-02 RX ADMIN — MAGNESIUM HYDROXIDE 30 MILLILITER(S): 400 TABLET, CHEWABLE ORAL at 09:35

## 2021-06-02 RX ADMIN — HEPARIN SODIUM 10000 UNIT(S): 5000 INJECTION INTRAVENOUS; SUBCUTANEOUS at 05:57

## 2021-06-02 RX ADMIN — HEPARIN SODIUM 10000 UNIT(S): 5000 INJECTION INTRAVENOUS; SUBCUTANEOUS at 18:14

## 2021-06-02 RX ADMIN — Medication 30 MILLIGRAM(S): at 00:06

## 2021-06-02 RX ADMIN — Medication 975 MILLIGRAM(S): at 09:37

## 2021-06-02 RX ADMIN — OXYCODONE HYDROCHLORIDE 5 MILLIGRAM(S): 5 TABLET ORAL at 22:18

## 2021-06-02 RX ADMIN — Medication 30 MILLIGRAM(S): at 13:05

## 2021-06-02 RX ADMIN — Medication 975 MILLIGRAM(S): at 02:44

## 2021-06-02 RX ADMIN — Medication 975 MILLIGRAM(S): at 16:00

## 2021-06-02 RX ADMIN — Medication 975 MILLIGRAM(S): at 02:13

## 2021-06-02 RX ADMIN — Medication 30 MILLIGRAM(S): at 05:57

## 2021-06-02 RX ADMIN — Medication 975 MILLIGRAM(S): at 10:07

## 2021-06-02 RX ADMIN — Medication 600 MILLIGRAM(S): at 18:13

## 2021-06-02 RX ADMIN — OXYCODONE HYDROCHLORIDE 5 MILLIGRAM(S): 5 TABLET ORAL at 13:10

## 2021-06-02 RX ADMIN — Medication 975 MILLIGRAM(S): at 20:13

## 2021-06-02 RX ADMIN — Medication 30 MILLIGRAM(S): at 06:32

## 2021-06-02 RX ADMIN — SIMETHICONE 80 MILLIGRAM(S): 80 TABLET, CHEWABLE ORAL at 05:58

## 2021-06-02 RX ADMIN — Medication 975 MILLIGRAM(S): at 21:00

## 2021-06-02 RX ADMIN — OXYCODONE HYDROCHLORIDE 5 MILLIGRAM(S): 5 TABLET ORAL at 21:55

## 2021-06-02 RX ADMIN — Medication 30 MILLIGRAM(S): at 13:35

## 2021-06-02 NOTE — LACTATION INITIAL EVALUATION - LACTATION INTERVENTIONS
assisted to put baby to both breasts in football hold position.  Baby has had several bottles of formula with nipple and appears nipple confused.  Was able to latch to both breasts after sucking a few times on the bottle and was noted to achieve deep latch and suck with stimulation./initiate skin to skin/initiate hand expression routine/initiate/review early breastfeeding management guidelines/initiate/review techniques for position and latch/initiate/review breast massage/compression

## 2021-06-03 VITALS
RESPIRATION RATE: 18 BRPM | DIASTOLIC BLOOD PRESSURE: 66 MMHG | TEMPERATURE: 98 F | SYSTOLIC BLOOD PRESSURE: 141 MMHG | OXYGEN SATURATION: 100 % | HEART RATE: 93 BPM

## 2021-06-03 LAB
BASOPHILS # BLD AUTO: 0.03 K/UL — SIGNIFICANT CHANGE UP (ref 0–0.2)
BASOPHILS NFR BLD AUTO: 0.3 % — SIGNIFICANT CHANGE UP (ref 0–2)
EOSINOPHIL # BLD AUTO: 0.11 K/UL — SIGNIFICANT CHANGE UP (ref 0–0.5)
EOSINOPHIL NFR BLD AUTO: 1.3 % — SIGNIFICANT CHANGE UP (ref 0–6)
HCT VFR BLD CALC: 33.7 % — LOW (ref 34.5–45)
HGB BLD-MCNC: 10.6 G/DL — LOW (ref 11.5–15.5)
IANC: 5.97 K/UL — SIGNIFICANT CHANGE UP (ref 1.5–8.5)
IMM GRANULOCYTES NFR BLD AUTO: 0.9 % — SIGNIFICANT CHANGE UP (ref 0–1.5)
LYMPHOCYTES # BLD AUTO: 1.87 K/UL — SIGNIFICANT CHANGE UP (ref 1–3.3)
LYMPHOCYTES # BLD AUTO: 21.4 % — SIGNIFICANT CHANGE UP (ref 13–44)
MCHC RBC-ENTMCNC: 29 PG — SIGNIFICANT CHANGE UP (ref 27–34)
MCHC RBC-ENTMCNC: 31.5 GM/DL — LOW (ref 32–36)
MCV RBC AUTO: 92.3 FL — SIGNIFICANT CHANGE UP (ref 80–100)
MONOCYTES # BLD AUTO: 0.67 K/UL — SIGNIFICANT CHANGE UP (ref 0–0.9)
MONOCYTES NFR BLD AUTO: 7.7 % — SIGNIFICANT CHANGE UP (ref 2–14)
NEUTROPHILS # BLD AUTO: 5.97 K/UL — SIGNIFICANT CHANGE UP (ref 1.8–7.4)
NEUTROPHILS NFR BLD AUTO: 68.4 % — SIGNIFICANT CHANGE UP (ref 43–77)
NRBC # BLD: 0 /100 WBCS — SIGNIFICANT CHANGE UP
NRBC # FLD: 0 K/UL — SIGNIFICANT CHANGE UP
PLATELET # BLD AUTO: 273 K/UL — SIGNIFICANT CHANGE UP (ref 150–400)
RBC # BLD: 3.65 M/UL — LOW (ref 3.8–5.2)
RBC # FLD: 13.3 % — SIGNIFICANT CHANGE UP (ref 10.3–14.5)
WBC # BLD: 8.73 K/UL — SIGNIFICANT CHANGE UP (ref 3.8–10.5)
WBC # FLD AUTO: 8.73 K/UL — SIGNIFICANT CHANGE UP (ref 3.8–10.5)

## 2021-06-03 RX ORDER — OXYCODONE HYDROCHLORIDE 5 MG/1
5 TABLET ORAL
Refills: 0 | Status: DISCONTINUED | OUTPATIENT
Start: 2021-06-03 | End: 2021-06-03

## 2021-06-03 RX ORDER — OXYCODONE HYDROCHLORIDE 5 MG/1
5 TABLET ORAL ONCE
Refills: 0 | Status: DISCONTINUED | OUTPATIENT
Start: 2021-06-03 | End: 2021-06-03

## 2021-06-03 RX ADMIN — Medication 600 MILLIGRAM(S): at 06:19

## 2021-06-03 RX ADMIN — Medication 600 MILLIGRAM(S): at 05:57

## 2021-06-03 RX ADMIN — HEPARIN SODIUM 10000 UNIT(S): 5000 INJECTION INTRAVENOUS; SUBCUTANEOUS at 05:57

## 2021-06-03 RX ADMIN — Medication 975 MILLIGRAM(S): at 08:55

## 2021-06-03 RX ADMIN — OXYCODONE HYDROCHLORIDE 5 MILLIGRAM(S): 5 TABLET ORAL at 14:35

## 2021-06-03 RX ADMIN — Medication 600 MILLIGRAM(S): at 14:00

## 2021-06-03 RX ADMIN — Medication 600 MILLIGRAM(S): at 00:09

## 2021-06-03 RX ADMIN — Medication 975 MILLIGRAM(S): at 08:41

## 2021-06-03 RX ADMIN — SIMETHICONE 80 MILLIGRAM(S): 80 TABLET, CHEWABLE ORAL at 05:58

## 2021-06-03 RX ADMIN — Medication 975 MILLIGRAM(S): at 03:43

## 2021-06-03 RX ADMIN — Medication 600 MILLIGRAM(S): at 13:25

## 2021-06-03 RX ADMIN — Medication 600 MILLIGRAM(S): at 00:41

## 2021-06-03 RX ADMIN — Medication 975 MILLIGRAM(S): at 04:20

## 2021-06-03 NOTE — PROGRESS NOTE ADULT - SUBJECTIVE AND OBJECTIVE BOX
OB Attending Progress Note:  Delivery, POD#1    S: 36yo POD#1 s/p rLTCS . Her pain is well controlled. She is tolerating a regular diet and passing flatus. Denies N/V. Denies CP/SOB/lightheadedness/dizziness.   She is ambulating without difficulty.   Voiding spontanously.     O:   Vital Signs Last 24 Hrs  T(C): 36.8 (2021 05:19), Max: 36.8 (2021 22:08)  T(F): 98.3 (2021 05:19), Max: 98.3 (2021 01:45)  HR: 76 (2021 05:19) (72 - 104)  BP: 116/50 (2021 05:19) (100/60 - 136/82)  BP(mean): 76 (2021 17:30) (57 - 93)  RR: 18 (2021 05:19) (14 - 23)  SpO2: 97% (2021 05:19) (97% - 99%)    Labs:  Blood type: O Positive  Rubella IgG: RPR: Negative                          10.7<L>   9.78 >-----------< 254    (  @ 06:33 )             33.2<L>                        12.8   10.64<H> >-----------< 342    (  @ 09:17 )             39.5                  PE:  General: NAD  Abdomen: Mildly distended, appropriately tender, incision c/d/i.  Extremities: No erythema, no pitting edema    
Postop Day  __1_ s/p   C- Section    THERAPY:  [ x ] Spinal morphine   [  ] Epidural morphine   [  ] IV PCA Hydromorphone 1 mg/ml    acetaminophen   Tablet .. 975 milliGRAM(s) Oral <User Schedule>  butorphanol Injectable 0.25 milliGRAM(s) IV Push every 6 hours PRN  dexAMETHasone  Injectable 4 milliGRAM(s) IV Push every 6 hours PRN  diphenhydrAMINE 25 milliGRAM(s) Oral every 6 hours PRN  diphtheria/tetanus/pertussis (acellular) Vaccine (ADAcel) 0.5 milliLiter(s) IntraMuscular once  heparin   Injectable 52029 Unit(s) SubCutaneous every 12 hours  ibuprofen  Tablet. 600 milliGRAM(s) Oral every 6 hours  ketorolac   Injectable 30 milliGRAM(s) IV Push every 6 hours  lactated ringers. 1000 milliLiter(s) IV Continuous <Continuous>  lactated ringers. 1000 milliLiter(s) IV Continuous <Continuous>  lanolin Ointment 1 Application(s) Topical every 6 hours PRN  magnesium hydroxide Suspension 30 milliLiter(s) Oral two times a day PRN  naloxone Injectable 0.1 milliGRAM(s) IV Push every 3 minutes PRN  ondansetron Injectable 4 milliGRAM(s) IV Push every 6 hours PRN  oxyCODONE    IR 5 milliGRAM(s) Oral every 3 hours PRN  oxyCODONE    IR 5 milliGRAM(s) Oral once PRN  oxyCODONE    IR 5 milliGRAM(s) Oral every 3 hours PRN  oxyCODONE    IR 10 milliGRAM(s) Oral every 3 hours PRN  oxytocin Infusion 333.333 milliUNIT(s)/Min IV Continuous <Continuous>  simethicone 80 milliGRAM(s) Chew every 4 hours PRN      T(C): 36.8 (06-02-21 @ 05:19), Max: 36.8 (06-01-21 @ 22:08)  HR: 76 (06-02-21 @ 05:19) (72 - 104)  BP: 116/50 (06-02-21 @ 05:19) (100/60 - 136/82)  RR: 18 (06-02-21 @ 05:19) (14 - 23)  SpO2: 97% (06-02-21 @ 05:19) (97% - 99%)    Pain:   ______mild_____ at rest;  _____moderate______with activity    Sedation Score:	  [ x ] Alert	    [  ] Drowsy        [  ] Arousable	[  ] Asleep	[  ] Unresponsive    Side Effects:	  [  ] None	     [ x ] Nausea        [ x ] Pruritus        [  ] Weakness   [  ] Numbness        ASSESSMENT/ PLAN  [ x  ] Side effects resolving      [x   ] Patient made aware of PRN meds available     [ x] Discontinue & switch to PRN pain medications        [  ] Continue       Patient states lower extremities feel and move normally. No apparent anesthetic complications.
ANESTHESIA POSTOP CHECK    35y Female POSTOP DAY 1     Vital Signs Last 24 Hrs  T(C): 36.8 (02 Jun 2021 05:19), Max: 36.8 (01 Jun 2021 22:08)  T(F): 98.3 (02 Jun 2021 05:19), Max: 98.3 (02 Jun 2021 01:45)  HR: 76 (02 Jun 2021 05:19) (72 - 104)  BP: 116/50 (02 Jun 2021 05:19) (100/60 - 136/82)  BP(mean): 76 (01 Jun 2021 17:30) (57 - 93)  RR: 18 (02 Jun 2021 05:19) (14 - 23)  SpO2: 97% (02 Jun 2021 05:19) (97% - 99%)  I&O's Summary    01 Jun 2021 07:01  -  02 Jun 2021 07:00  --------------------------------------------------------  IN: 6400 mL / OUT: 3787 mL / NET: 2613 mL        [X ] NO APPARENT ANESTHESIA COMPLICATIONS      Comments: 
Patient assessed at 0728.  Subjective  35y      POD#2    repeat post-operative  section delivery.  Medical/Surgical/OB/GYN History of  section (2019), colposcopy. Patient denies any pain at the time of assessment, but reported increase pain with relief with acetaminophen, ibuprofen and oxycodone. Patient denies any headache, blur vision, dizziness and/or weakness/fatigue. Out of bed ambulating passing flatus, positive bowel movement, voiding without difficulties. Tolerating a regular diet. Patient using breast pump due to NICU. Patient requesting to be discharge to home.       Vitals  T(C): 36.7 (21 @ 05:57), Max: 37.2 (21 @ 09:58)  HR: 83 (21 @ 05:57) (75 - 87)  BP: 121/75 (21 @ 05:57) (120/83 - 131/78)  RR: 18 (21 @ 05:57) (18 - 18)  SpO2: 100% (21 @ 05:57) (98% - 100%)  Wt(kg): --                 LABS:               10.6   8.73  )-----------( 273      ( 2021 06:40 )             33.7       Blood Type: O Positive    RPR: Negative    COVID-19 negative          MEDICATIONS  (STANDING):  acetaminophen   Tablet .. 975 milliGRAM(s) Oral <User Schedule>  diphtheria/tetanus/pertussis (acellular) Vaccine (ADAcel) 0.5 milliLiter(s) IntraMuscular once  heparin   Injectable 16386 Unit(s) SubCutaneous every 12 hours  ibuprofen  Tablet. 600 milliGRAM(s) Oral every 6 hours    MEDICATIONS  (PRN):  diphenhydrAMINE 25 milliGRAM(s) Oral every 6 hours PRN Pruritus  lanolin Ointment 1 Application(s) Topical every 6 hours PRN Sore Nipples  magnesium hydroxide Suspension 30 milliLiter(s) Oral two times a day PRN Constipation  oxyCODONE    IR 5 milliGRAM(s) Oral every 3 hours PRN Moderate to Severe Pain (4-10)  oxyCODONE    IR 5 milliGRAM(s) Oral once PRN Moderate to Severe Pain (4-10)  simethicone 80 milliGRAM(s) Chew every 4 hours PRN Gas

## 2021-06-03 NOTE — PROGRESS NOTE ADULT - ASSESSMENT
PE:  Lung sounds clear bilaterally. Normal heart rhythm.   Breasts: soft, nontender  Nipples: intact  Abdomen: Soft, nondistended and nontender. Bowel sounds present. Fundus firm  Abdominal incision: ANDREZ dressing clean, dry and intact.   Vaginal: Lochia light rubra  Extremities: Moderate edema noted bilaterally and equal to lower extremities, nontender with no erythremic areas noted. Positive pedal pulses. No palpable veins noted  Other relevant physical exam findings: none          Plan  A/P: 35y      Day#2    repeat post-operative  section delivery. . Stable          Problem#1:  section delivery  Continue routine post-operative and postpartum care  Increase ambulation, pain medication protocol of standing ibuprofen and acetaminophen and oxycodone prn  Encouraged to wear abdominal binder for support and to use incentive spirometer  Discussed breast/nipple care and breastfeeding.  Discussed abdominal incision care and ANDREZ dressing care.  Discussed discharge planning. 
A/P: 36yo POD#1 s/p rLTCS.  Patient is stable and doing well post-operatively.    - Continue regular diet.  - Increase ambulation.  - Continue motrin, tylenol, oxycodone PRN for pain control.   - AM CBC w/ appropriate H/H hina Tompkins MD

## 2021-06-10 LAB — SURGICAL PATHOLOGY STUDY: SIGNIFICANT CHANGE UP

## 2021-07-14 PROBLEM — B00.9 HERPESVIRAL INFECTION, UNSPECIFIED: Chronic | Status: ACTIVE | Noted: 2021-06-01

## 2021-07-30 ENCOUNTER — OUTPATIENT (OUTPATIENT)
Dept: OUTPATIENT SERVICES | Facility: HOSPITAL | Age: 36
LOS: 1 days | Discharge: HOME | End: 2021-07-30
Payer: COMMERCIAL

## 2021-07-30 DIAGNOSIS — Z98.890 OTHER SPECIFIED POSTPROCEDURAL STATES: Chronic | ICD-10-CM

## 2021-07-30 PROCEDURE — 90792 PSYCH DIAG EVAL W/MED SRVCS: CPT | Mod: 95

## 2021-08-24 PROCEDURE — 99214 OFFICE O/P EST MOD 30 MIN: CPT | Mod: 95

## 2021-08-27 DIAGNOSIS — F41.1 GENERALIZED ANXIETY DISORDER: ICD-10-CM

## 2021-08-27 DIAGNOSIS — F33.9 MAJOR DEPRESSIVE DISORDER, RECURRENT, UNSPECIFIED: ICD-10-CM

## 2021-09-20 ENCOUNTER — NON-APPOINTMENT (OUTPATIENT)
Age: 36
End: 2021-09-20

## 2021-09-20 ENCOUNTER — APPOINTMENT (OUTPATIENT)
Dept: INTERNAL MEDICINE | Facility: CLINIC | Age: 36
End: 2021-09-20
Payer: COMMERCIAL

## 2021-09-20 VITALS
OXYGEN SATURATION: 98 % | TEMPERATURE: 97 F | HEART RATE: 79 BPM | BODY MASS INDEX: 37.22 KG/M2 | WEIGHT: 218 LBS | DIASTOLIC BLOOD PRESSURE: 85 MMHG | SYSTOLIC BLOOD PRESSURE: 134 MMHG | HEIGHT: 64 IN

## 2021-09-20 DIAGNOSIS — Q37.9 UNSPECIFIED CLEFT PALATE WITH UNILATERAL CLEFT LIP: ICD-10-CM

## 2021-09-20 DIAGNOSIS — R63.5 ABNORMAL WEIGHT GAIN: ICD-10-CM

## 2021-09-20 DIAGNOSIS — Z80.0 FAMILY HISTORY OF MALIGNANT NEOPLASM OF DIGESTIVE ORGANS: ICD-10-CM

## 2021-09-20 DIAGNOSIS — Z00.00 ENCOUNTER FOR GENERAL ADULT MEDICAL EXAMINATION W/OUT ABNORMAL FINDINGS: ICD-10-CM

## 2021-09-20 DIAGNOSIS — Z83.49 FAMILY HISTORY OF OTHER ENDOCRINE, NUTRITIONAL AND METABOLIC DISEASES: ICD-10-CM

## 2021-09-20 DIAGNOSIS — Z23 ENCOUNTER FOR IMMUNIZATION: ICD-10-CM

## 2021-09-20 PROCEDURE — G0008: CPT

## 2021-09-20 PROCEDURE — 99385 PREV VISIT NEW AGE 18-39: CPT | Mod: 25

## 2021-09-20 PROCEDURE — 93000 ELECTROCARDIOGRAM COMPLETE: CPT

## 2021-09-20 PROCEDURE — 90686 IIV4 VACC NO PRSV 0.5 ML IM: CPT

## 2021-09-20 PROCEDURE — 36415 COLL VENOUS BLD VENIPUNCTURE: CPT

## 2021-09-20 RX ORDER — PNV/FERROUS SULFATE/FOLIC ACID 27-<0.5MG
TABLET ORAL
Refills: 0 | Status: DISCONTINUED | COMMUNITY
End: 2021-09-20

## 2021-09-20 RX ORDER — ESCITALOPRAM OXALATE 10 MG/1
10 TABLET, FILM COATED ORAL
Qty: 6 | Refills: 0 | Status: ACTIVE | COMMUNITY

## 2021-09-20 NOTE — HISTORY OF PRESENT ILLNESS
[de-identified] : 35 year old female presents for initial annual exam.\par \par no acute complaints.  \par \par States over the last year has been dealing with a lot.  Having second child 3 months ago, death of her father 1 month ago and difficulty losing weight.  being followed with postpartum specialist.  was started on lexapro with good benefit.  \par \par \par \par \par

## 2021-09-20 NOTE — PHYSICAL EXAM
[Normal] : normal rate, regular rhythm, normal S1 and S2 and no murmur heard [No Varicosities] : no varicosities [Pedal Pulses Present] : the pedal pulses are present [No Edema] : there was no peripheral edema [No Extremity Clubbing/Cyanosis] : no extremity clubbing/cyanosis [Soft] : abdomen soft [Non Tender] : non-tender [Non-distended] : non-distended [Normal Bowel Sounds] : normal bowel sounds

## 2021-09-20 NOTE — HEALTH RISK ASSESSMENT
[Patient reported PAP Smear was normal] : Patient reported PAP Smear was normal [Employed] : employed [] :  [# Of Children ___] : has [unfilled] children [Sexually Active] : sexually active [Fully functional (bathing, dressing, toileting, transferring, walking, feeding)] : Fully functional (bathing, dressing, toileting, transferring, walking, feeding) [Fully functional (using the telephone, shopping, preparing meals, housekeeping, doing laundry, using] : Fully functional and needs no help or supervision to perform IADLs (using the telephone, shopping, preparing meals, housekeeping, doing laundry, using transportation, managing medications and managing finances) [Fair] :  ~his/her~ mood as fair [Yes] : Yes [Monthly or less (1 pt)] : Monthly or less (1 point) [1 or 2 (0 pts)] : 1 or 2 (0 points) [Never (0 pts)] : Never (0 points) [No] : In the past 12 months have you used drugs other than those required for medical reasons? No [No falls in past year] : Patient reported no falls in the past year [0] : 2) Feeling down, depressed, or hopeless: Not at all (0) [] : No [[id number]] : 0 [Reports changes in hearing] : Reports no changes in hearing [Reports changes in vision] : Reports no changes in vision [Reports changes in dental health] : Reports no changes in dental health [PapSmearDate] : 2020 [ColonoscopyDate] : 2016

## 2021-09-20 NOTE — ASSESSMENT
[FreeTextEntry1] : 35 year old female presents for initial annual exam.\par \par Obese\par -Being overweight increases your risk of health conditions such as heart disease, high blood pressure, type 2 diabetes, and certain types of cancer. It can also increase your risk for osteoarthritis, sleep apnea, and other respiratory problems. Aim for a slow, steady weight loss. Even a small amount of weight loss can lower your risk of health problems.\par -A safe and healthy way to lose weight is to eat fewer calories and get regular exercise. You can lose up about 1 pound a week by decreasing the number of calories you eat by 500 calories each day. You can decrease calories by eating smaller portion sizes or by cutting out high-calorie foods. Read labels to find out how many calories are in the foods you eat. You can also burn calories with exercise such as walking, swimming, or biking. You will be more likely to keep weight off if you make these changes part of your lifestyle.\par -Exercise at least 30 minutes per day on most days of the week. Some examples of exercise include walking, biking, dancing, and swimming. You can also fit in more physical activity by taking the stairs instead of the elevator or parking farther away from stores.\par \par postpartum depression, improving \par -f/u as per psych\par -cont lexapro as directed \par -encouraged relaxation, tech, deep breathing exercises, yoga, acupuncture\par -follow-up with therapist as directed \par \par Annual \par -Advised to get yearly Flu shot -given today \par -Advised Yearly Eye exam with Ophthalmologist\par -Advised Yearly Dental exam\par -Educated of the importance of Healthy diet, such as Mediterranean Diet and Exercise, such as walking >20 minutes a day and increasing gradually as tolerated\par \par Preventative screening \par -advised to get colonoscopy for colon cancer screening -last in 2016, advised to repeat due to fmhx\par -advised to get PAP for cervical cancer screening -UTD\par \par -covid -UTD\par \par \par \par \par \par \par \par \par

## 2021-09-22 LAB
25(OH)D3 SERPL-MCNC: 37.6 NG/ML
ALBUMIN SERPL ELPH-MCNC: 4.8 G/DL
ALP BLD-CCNC: 122 U/L
ALT SERPL-CCNC: 66 U/L
ANION GAP SERPL CALC-SCNC: 18 MMOL/L
APPEARANCE: CLEAR
AST SERPL-CCNC: 27 U/L
BACTERIA: NEGATIVE
BASOPHILS # BLD AUTO: 0.05 K/UL
BASOPHILS NFR BLD AUTO: 0.6 %
BILIRUB DIRECT SERPL-MCNC: 0 MG/DL
BILIRUB INDIRECT SERPL-MCNC: NORMAL MG/DL
BILIRUB SERPL-MCNC: 0.2 MG/DL
BILIRUBIN URINE: NEGATIVE
BLOOD URINE: NEGATIVE
BUN SERPL-MCNC: 13 MG/DL
CALCIUM SERPL-MCNC: 10 MG/DL
CHLORIDE SERPL-SCNC: 96 MMOL/L
CHOLEST SERPL-MCNC: 197 MG/DL
CO2 SERPL-SCNC: 24 MMOL/L
COLOR: NORMAL
CREAT SERPL-MCNC: 0.71 MG/DL
EOSINOPHIL # BLD AUTO: 0.12 K/UL
EOSINOPHIL NFR BLD AUTO: 1.4 %
ESTIMATED AVERAGE GLUCOSE: 111 MG/DL
FERRITIN SERPL-MCNC: 45 NG/ML
FOLATE SERPL-MCNC: 18.7 NG/ML
GLUCOSE QUALITATIVE U: NEGATIVE
GLUCOSE SERPL-MCNC: 90 MG/DL
HBA1C MFR BLD HPLC: 5.5 %
HCT VFR BLD CALC: 40.6 %
HDLC SERPL-MCNC: 52 MG/DL
HGB BLD-MCNC: 13.3 G/DL
HYALINE CASTS: 0 /LPF
IMM GRANULOCYTES NFR BLD AUTO: 0.2 %
IRON SATN MFR SERPL: 14 %
IRON SERPL-MCNC: 48 UG/DL
KETONES URINE: NEGATIVE
LDLC SERPL CALC-MCNC: 117 MG/DL
LEUKOCYTE ESTERASE URINE: NEGATIVE
LYMPHOCYTES # BLD AUTO: 2.53 K/UL
LYMPHOCYTES NFR BLD AUTO: 29.3 %
MAN DIFF?: NORMAL
MCHC RBC-ENTMCNC: 29.9 PG
MCHC RBC-ENTMCNC: 32.8 GM/DL
MCV RBC AUTO: 91.2 FL
MICROSCOPIC-UA: NORMAL
MONOCYTES # BLD AUTO: 0.65 K/UL
MONOCYTES NFR BLD AUTO: 7.5 %
NEUTROPHILS # BLD AUTO: 5.27 K/UL
NEUTROPHILS NFR BLD AUTO: 61 %
NITRITE URINE: NEGATIVE
NONHDLC SERPL-MCNC: 144 MG/DL
PH URINE: 7
PLATELET # BLD AUTO: 378 K/UL
POTASSIUM SERPL-SCNC: 4.5 MMOL/L
PROT SERPL-MCNC: 7.4 G/DL
PROTEIN URINE: NEGATIVE
RBC # BLD: 4.45 M/UL
RBC # FLD: 13.2 %
RED BLOOD CELLS URINE: 3 /HPF
SODIUM SERPL-SCNC: 139 MMOL/L
SPECIFIC GRAVITY URINE: 1.01
SQUAMOUS EPITHELIAL CELLS: 7 /HPF
T3FREE SERPL-MCNC: 2.98 PG/ML
T4 FREE SERPL-MCNC: 1 NG/DL
THYROGLOB AB SERPL-ACNC: <20 IU/ML
THYROPEROXIDASE AB SERPL IA-ACNC: 30.9 IU/ML
TIBC SERPL-MCNC: 350 UG/DL
TRIGL SERPL-MCNC: 135 MG/DL
TSH SERPL-ACNC: 0.97 UIU/ML
UIBC SERPL-MCNC: 302 UG/DL
UROBILINOGEN URINE: NORMAL
VIT B12 SERPL-MCNC: 542 PG/ML
WBC # FLD AUTO: 8.64 K/UL
WHITE BLOOD CELLS URINE: 3 /HPF

## 2021-09-23 LAB
M TB IFN-G BLD-IMP: NEGATIVE
QUANTIFERON TB PLUS MITOGEN MINUS NIL: 7.36 IU/ML
QUANTIFERON TB PLUS NIL: 0.01 IU/ML
QUANTIFERON TB PLUS TB1 MINUS NIL: 0.01 IU/ML
QUANTIFERON TB PLUS TB2 MINUS NIL: 0.02 IU/ML

## 2021-09-23 PROCEDURE — 99214 OFFICE O/P EST MOD 30 MIN: CPT | Mod: 95

## 2021-09-24 ENCOUNTER — NON-APPOINTMENT (OUTPATIENT)
Age: 36
End: 2021-09-24

## 2021-09-28 ENCOUNTER — APPOINTMENT (OUTPATIENT)
Dept: GASTROENTEROLOGY | Facility: CLINIC | Age: 36
End: 2021-09-28
Payer: COMMERCIAL

## 2021-09-28 VITALS
WEIGHT: 210 LBS | TEMPERATURE: 96.9 F | SYSTOLIC BLOOD PRESSURE: 128 MMHG | HEIGHT: 64 IN | OXYGEN SATURATION: 99 % | BODY MASS INDEX: 35.85 KG/M2 | HEART RATE: 70 BPM | DIASTOLIC BLOOD PRESSURE: 76 MMHG

## 2021-09-28 DIAGNOSIS — Z86.010 PERSONAL HISTORY OF COLONIC POLYPS: ICD-10-CM

## 2021-09-28 DIAGNOSIS — Z12.11 ENCOUNTER FOR SCREENING FOR MALIGNANT NEOPLASM OF COLON: ICD-10-CM

## 2021-09-28 DIAGNOSIS — Z80.0 ENCOUNTER FOR SCREENING FOR MALIGNANT NEOPLASM OF COLON: ICD-10-CM

## 2021-09-28 PROCEDURE — 99213 OFFICE O/P EST LOW 20 MIN: CPT

## 2021-09-28 RX ORDER — CEPHALEXIN 500 MG/1
500 TABLET ORAL
Qty: 14 | Refills: 0 | Status: ACTIVE | COMMUNITY
Start: 2021-07-14

## 2021-09-28 RX ORDER — OXYCODONE 5 MG/1
5 TABLET ORAL
Qty: 8 | Refills: 0 | Status: ACTIVE | COMMUNITY
Start: 2021-06-03

## 2021-09-28 RX ORDER — SODIUM SULFATE, POTASSIUM SULFATE, MAGNESIUM SULFATE 17.5; 3.13; 1.6 G/ML; G/ML; G/ML
17.5-3.13-1.6 SOLUTION, CONCENTRATE ORAL
Qty: 1 | Refills: 0 | Status: COMPLETED | COMMUNITY
Start: 2021-09-28 | End: 2021-09-29

## 2021-09-28 RX ORDER — ESCITALOPRAM OXALATE 5 MG/1
5 TABLET ORAL
Qty: 30 | Refills: 0 | Status: ACTIVE | COMMUNITY
Start: 2021-09-23

## 2021-09-28 NOTE — HISTORY OF PRESENT ILLNESS
[Heartburn] : denies heartburn [Nausea] : denies nausea [Vomiting] : denies vomiting [Diarrhea] : denies diarrhea [Constipation] : denies constipation [Yellow Skin Or Eyes (Jaundice)] : denies jaundice [Abdominal Pain] : denies abdominal pain [Abdominal Swelling] : denies abdominal swelling [Rectal Pain] : denies rectal pain [GERD] : no gastroesophageal reflux disease [de-identified] : 35-year-old female with history of colon polyps and significant family history of colon cancer here for follow-up surveillance colonoscopy.  Last colonoscopy over 5 years ago.  Had adenomatous polyp removed.  Has several family members with colon cancer under the age of 30.  Patient is 4 months postpartum.  Has no GI complaints other than isolated episode of bright red blood per rectum after straining at bowel movement the other day.

## 2021-09-28 NOTE — ASSESSMENT
[FreeTextEntry1] : Impression: Personal history of colon polyps, family history of colon cancer.  Due for follow-up surveillance colonoscopy.  We will schedule with [facility].

## 2021-09-28 NOTE — PHYSICAL EXAM
[General Appearance - Alert] : alert [General Appearance - In No Acute Distress] : in no acute distress [FreeTextEntry1] : Moderately obese [Neck Appearance] : the appearance of the neck was normal [Neck Cervical Mass (___cm)] : no neck mass was observed [Jugular Venous Distention Increased] : there was no jugular-venous distention [Thyroid Diffuse Enlargement] : the thyroid was not enlarged [Thyroid Nodule] : there were no palpable thyroid nodules [] : no respiratory distress [Auscultation Breath Sounds / Voice Sounds] : lungs were clear to auscultation bilaterally [Heart Rate And Rhythm] : heart rate was normal and rhythm regular [Heart Sounds] : normal S1 and S2 [Heart Sounds Gallop] : no gallops [Murmurs] : no murmurs [Heart Sounds Pericardial Friction Rub] : no pericardial rub [Breast Appearance] : normal in appearance [Breast Palpation Mass] : no palpable masses

## 2021-11-11 PROCEDURE — 99214 OFFICE O/P EST MOD 30 MIN: CPT | Mod: 95

## 2021-11-29 DIAGNOSIS — Z20.822 CONTACT WITH AND (SUSPECTED) EXPOSURE TO COVID-19: ICD-10-CM

## 2021-11-29 DIAGNOSIS — Z01.818 ENCOUNTER FOR OTHER PREPROCEDURAL EXAMINATION: ICD-10-CM

## 2021-12-10 ENCOUNTER — APPOINTMENT (OUTPATIENT)
Dept: GASTROENTEROLOGY | Facility: AMBULATORY MEDICAL SERVICES | Age: 36
End: 2021-12-10
Payer: COMMERCIAL

## 2021-12-10 PROCEDURE — 45378 DIAGNOSTIC COLONOSCOPY: CPT

## 2022-01-05 PROCEDURE — 99214 OFFICE O/P EST MOD 30 MIN: CPT | Mod: 95

## 2022-01-11 ENCOUNTER — TRANSCRIPTION ENCOUNTER (OUTPATIENT)
Age: 37
End: 2022-01-11

## 2022-01-19 ENCOUNTER — TRANSCRIPTION ENCOUNTER (OUTPATIENT)
Age: 37
End: 2022-01-19

## 2022-01-25 ENCOUNTER — TRANSCRIPTION ENCOUNTER (OUTPATIENT)
Age: 37
End: 2022-01-25

## 2022-02-02 PROCEDURE — 99214 OFFICE O/P EST MOD 30 MIN: CPT | Mod: 95

## 2022-03-02 PROCEDURE — 99214 OFFICE O/P EST MOD 30 MIN: CPT | Mod: 95

## 2022-04-02 ENCOUNTER — TRANSCRIPTION ENCOUNTER (OUTPATIENT)
Age: 37
End: 2022-04-02

## 2022-04-06 PROCEDURE — 99214 OFFICE O/P EST MOD 30 MIN: CPT | Mod: 95

## 2022-05-11 PROCEDURE — 99214 OFFICE O/P EST MOD 30 MIN: CPT | Mod: 95

## 2022-06-07 PROCEDURE — 99214 OFFICE O/P EST MOD 30 MIN: CPT | Mod: 95

## 2022-11-26 ENCOUNTER — NON-APPOINTMENT (OUTPATIENT)
Age: 37
End: 2022-11-26

## 2023-01-21 ENCOUNTER — NON-APPOINTMENT (OUTPATIENT)
Age: 38
End: 2023-01-21

## 2023-05-21 ENCOUNTER — NON-APPOINTMENT (OUTPATIENT)
Age: 38
End: 2023-05-21

## 2023-08-16 ENCOUNTER — RESULT REVIEW (OUTPATIENT)
Age: 38
End: 2023-08-16

## 2024-02-06 ENCOUNTER — NON-APPOINTMENT (OUTPATIENT)
Age: 39
End: 2024-02-06

## 2024-07-16 ENCOUNTER — NON-APPOINTMENT (OUTPATIENT)
Age: 39
End: 2024-07-16

## 2025-09-16 ENCOUNTER — RESULT REVIEW (OUTPATIENT)
Age: 40
End: 2025-09-16